# Patient Record
Sex: FEMALE | Race: WHITE | NOT HISPANIC OR LATINO | Employment: OTHER | URBAN - METROPOLITAN AREA
[De-identification: names, ages, dates, MRNs, and addresses within clinical notes are randomized per-mention and may not be internally consistent; named-entity substitution may affect disease eponyms.]

---

## 2024-04-12 RX ORDER — POLYETHYLENE GLYCOL 3350 17 G/17G
1 POWDER, FOR SOLUTION ORAL DAILY
COMMUNITY

## 2024-04-12 RX ORDER — MULTIVITAMIN,THERAPEUTIC
1 TABLET ORAL DAILY
COMMUNITY

## 2024-04-12 RX ORDER — DOXEPIN 3 MG/1
TABLET, FILM COATED ORAL
COMMUNITY
Start: 2023-05-04

## 2024-04-12 RX ORDER — ACETAMINOPHEN 500 MG
500-1000 TABLET ORAL EVERY 8 HOURS PRN
COMMUNITY
Start: 2023-09-27

## 2024-04-12 RX ORDER — IPRATROPIUM BROMIDE 21 UG/1
1 SPRAY, METERED NASAL
COMMUNITY
Start: 2023-01-05 | End: 2024-04-12

## 2024-04-12 RX ORDER — FLUTICASONE PROPIONATE 50 MCG
SPRAY, SUSPENSION (ML) NASAL
COMMUNITY
Start: 2024-02-26 | End: 2024-04-12

## 2024-04-12 RX ORDER — ATORVASTATIN CALCIUM 20 MG/1
20 TABLET, FILM COATED ORAL AT BEDTIME
COMMUNITY
Start: 2022-07-13

## 2024-04-12 RX ORDER — DONEPEZIL HYDROCHLORIDE 5 MG/1
5 TABLET, FILM COATED ORAL AT BEDTIME
COMMUNITY
Start: 2022-07-13

## 2024-04-12 RX ORDER — RALOXIFENE HYDROCHLORIDE 60 MG/1
60 TABLET, FILM COATED ORAL DAILY
COMMUNITY

## 2024-04-12 RX ORDER — CETIRIZINE HYDROCHLORIDE 10 MG/1
10 TABLET ORAL DAILY
COMMUNITY

## 2024-04-12 RX ORDER — PANTOPRAZOLE SODIUM 40 MG/1
TABLET, DELAYED RELEASE ORAL
COMMUNITY
Start: 2023-04-21

## 2024-04-12 RX ORDER — CALCIUM CARBONATE/VITAMIN D3 600 MG-10
1 TABLET ORAL 2 TIMES DAILY
COMMUNITY

## 2024-04-12 RX ORDER — NADOLOL 40 MG/1
40 TABLET ORAL DAILY
COMMUNITY

## 2024-04-12 RX ORDER — SERTRALINE HYDROCHLORIDE 100 MG/1
100 TABLET, FILM COATED ORAL
COMMUNITY
Start: 2022-07-13

## 2024-04-12 RX ORDER — LANOLIN ALCOHOL/MO/W.PET/CERES
3 CREAM (GRAM) TOPICAL
COMMUNITY

## 2024-04-12 RX ORDER — AMLODIPINE BESYLATE 5 MG/1
5 TABLET ORAL DAILY
COMMUNITY

## 2024-04-30 RX ORDER — AMOXICILLIN 250 MG
1 CAPSULE ORAL 2 TIMES DAILY
COMMUNITY

## 2024-04-30 NOTE — PHARMACY-ADMISSION MEDICATION HISTORY
Pharmacist Admission Medication History    Admission medication history is complete. The information provided in this note is only as accurate as the sources available at the time of the update.    Med rec completed by preadmitting RN   Reviewed by Patito Tracey RN (Registered Nurse) on 04/30/24 at 1338     Added frequencies to multiple entries for completeness       Medication History Completed By: Soumya Sheets [facility] 4/30/2024 2:20 PM    PTA Med List   Medication Sig Last Dose    acetaminophen (TYLENOL) 500 MG tablet Take 500-1,000 mg by mouth every 8 hours as needed     amLODIPine (NORVASC) 5 MG tablet Take 5 mg by mouth daily     atorvastatin (LIPITOR) 20 MG tablet Take 20 mg by mouth at bedtime     calcium carbonate-vitamin D (CALTRATE) 600-10 MG-MCG per tablet Take 1 tablet by mouth 2 times daily     cetirizine (ZYRTEC) 10 MG tablet Take 10 mg by mouth daily     donepezil (ARICEPT) 5 MG tablet Take 5 mg by mouth at bedtime     doxepin (SILENOR) 3 MG tablet TAKE 1 TABLET BY MOUTH EVERY DAY AT NIGHT 30 MINUTES BEFORE BEDTIME ON AN EMPTY STOMACH     melatonin 3 MG tablet Take 3 mg by mouth nightly as needed     multivitamin, therapeutic (THERA-VIT) TABS tablet Take 1 tablet by mouth daily     nadolol (CORGARD) 40 MG tablet Take 40 mg by mouth daily     pantoprazole (PROTONIX) 40 MG EC tablet 1 tablet Orally Once a day for 90 days     polyethylene glycol (MIRALAX) 17 g packet Take 1 packet by mouth daily     raloxifene (EVISTA) 60 MG tablet Take 60 mg by mouth daily     senna-docusate (SENOKOT-S/PERICOLACE) 8.6-50 MG tablet Take 1 tablet by mouth 2 times daily     sertraline (ZOLOFT) 100 MG tablet Take 100 mg by mouth

## 2024-05-01 ENCOUNTER — APPOINTMENT (OUTPATIENT)
Dept: GENERAL RADIOLOGY | Facility: CLINIC | Age: 69
DRG: 454 | End: 2024-05-01
Attending: ORTHOPAEDIC SURGERY
Payer: MEDICARE

## 2024-05-01 ENCOUNTER — ANESTHESIA (OUTPATIENT)
Dept: SURGERY | Facility: CLINIC | Age: 69
DRG: 454 | End: 2024-05-01
Payer: MEDICARE

## 2024-05-01 ENCOUNTER — ANESTHESIA EVENT (OUTPATIENT)
Dept: SURGERY | Facility: CLINIC | Age: 69
DRG: 454 | End: 2024-05-01
Payer: MEDICARE

## 2024-05-01 ENCOUNTER — HOSPITAL ENCOUNTER (INPATIENT)
Facility: CLINIC | Age: 69
LOS: 5 days | Discharge: NURSING, REHAB OR LONG-TERM CARE | DRG: 454 | End: 2024-05-06
Attending: ORTHOPAEDIC SURGERY | Admitting: ORTHOPAEDIC SURGERY
Payer: MEDICARE

## 2024-05-01 DIAGNOSIS — Z98.890 H/O SPINAL SURGERY: Primary | ICD-10-CM

## 2024-05-01 LAB
ABO/RH(D): NORMAL
ANTIBODY SCREEN: NEGATIVE
GLUCOSE BLDC GLUCOMTR-MCNC: 88 MG/DL (ref 70–99)
HGB BLD-MCNC: 13.4 G/DL (ref 11.7–15.7)
SPECIMEN EXPIRATION DATE: NORMAL

## 2024-05-01 PROCEDURE — 250N000011 HC RX IP 250 OP 636

## 2024-05-01 PROCEDURE — 85018 HEMOGLOBIN: CPT | Performed by: ORTHOPAEDIC SURGERY

## 2024-05-01 PROCEDURE — 710N000009 HC RECOVERY PHASE 1, LEVEL 1, PER MIN: Performed by: ORTHOPAEDIC SURGERY

## 2024-05-01 PROCEDURE — 258N000003 HC RX IP 258 OP 636

## 2024-05-01 PROCEDURE — 250N000011 HC RX IP 250 OP 636: Performed by: ORTHOPAEDIC SURGERY

## 2024-05-01 PROCEDURE — 250N000013 HC RX MED GY IP 250 OP 250 PS 637: Performed by: ANESTHESIOLOGY

## 2024-05-01 PROCEDURE — 86900 BLOOD TYPING SEROLOGIC ABO: CPT | Performed by: ORTHOPAEDIC SURGERY

## 2024-05-01 PROCEDURE — 370N000017 HC ANESTHESIA TECHNICAL FEE, PER MIN: Performed by: ORTHOPAEDIC SURGERY

## 2024-05-01 PROCEDURE — 120N000001 HC R&B MED SURG/OB

## 2024-05-01 PROCEDURE — 4A11X4G MONITORING OF PERIPHERAL NERVOUS ELECTRICAL ACTIVITY, INTRAOPERATIVE, EXTERNAL APPROACH: ICD-10-PCS | Performed by: ORTHOPAEDIC SURGERY

## 2024-05-01 PROCEDURE — 99222 1ST HOSP IP/OBS MODERATE 55: CPT | Mod: AI | Performed by: INTERNAL MEDICINE

## 2024-05-01 PROCEDURE — 258N000003 HC RX IP 258 OP 636: Performed by: ANESTHESIOLOGY

## 2024-05-01 PROCEDURE — 250N000013 HC RX MED GY IP 250 OP 250 PS 637: Performed by: INTERNAL MEDICINE

## 2024-05-01 PROCEDURE — 250N000025 HC SEVOFLURANE, PER MIN: Performed by: ORTHOPAEDIC SURGERY

## 2024-05-01 PROCEDURE — 360N000086 HC SURGERY LEVEL 6 W/ FLUORO, PER MIN: Performed by: ORTHOPAEDIC SURGERY

## 2024-05-01 PROCEDURE — 250N000013 HC RX MED GY IP 250 OP 250 PS 637

## 2024-05-01 PROCEDURE — C1713 ANCHOR/SCREW BN/BN,TIS/BN: HCPCS | Performed by: ORTHOPAEDIC SURGERY

## 2024-05-01 PROCEDURE — 250N000011 HC RX IP 250 OP 636: Performed by: ANESTHESIOLOGY

## 2024-05-01 PROCEDURE — 0SB20ZZ EXCISION OF LUMBAR VERTEBRAL DISC, OPEN APPROACH: ICD-10-PCS | Performed by: ORTHOPAEDIC SURGERY

## 2024-05-01 PROCEDURE — 8E0WXBF COMPUTER ASSISTED PROCEDURE OF TRUNK REGION, WITH FLUOROSCOPY: ICD-10-PCS | Performed by: ORTHOPAEDIC SURGERY

## 2024-05-01 PROCEDURE — 999N000141 HC STATISTIC PRE-PROCEDURE NURSING ASSESSMENT: Performed by: ORTHOPAEDIC SURGERY

## 2024-05-01 PROCEDURE — 36415 COLL VENOUS BLD VENIPUNCTURE: CPT | Performed by: ORTHOPAEDIC SURGERY

## 2024-05-01 PROCEDURE — 250N000009 HC RX 250: Performed by: ORTHOPAEDIC SURGERY

## 2024-05-01 PROCEDURE — 0SG1071 FUSION OF 2 OR MORE LUMBAR VERTEBRAL JOINTS WITH AUTOLOGOUS TISSUE SUBSTITUTE, POSTERIOR APPROACH, POSTERIOR COLUMN, OPEN APPROACH: ICD-10-PCS | Performed by: ORTHOPAEDIC SURGERY

## 2024-05-01 PROCEDURE — 999N000179 XR SURGERY CARM FLUORO LESS THAN 5 MIN W STILLS: Mod: TC

## 2024-05-01 PROCEDURE — 0SG10A0 FUSION OF 2 OR MORE LUMBAR VERTEBRAL JOINTS WITH INTERBODY FUSION DEVICE, ANTERIOR APPROACH, ANTERIOR COLUMN, OPEN APPROACH: ICD-10-PCS | Performed by: ORTHOPAEDIC SURGERY

## 2024-05-01 PROCEDURE — 922N000001 HC NEURO MONITORING SERVICE, UP TO 7 HOURS (T1FEE): Performed by: ORTHOPAEDIC SURGERY

## 2024-05-01 PROCEDURE — 250N000009 HC RX 250: Performed by: NURSE ANESTHETIST, CERTIFIED REGISTERED

## 2024-05-01 PROCEDURE — 258N000003 HC RX IP 258 OP 636: Performed by: NURSE ANESTHETIST, CERTIFIED REGISTERED

## 2024-05-01 PROCEDURE — 250N000011 HC RX IP 250 OP 636: Performed by: NURSE ANESTHETIST, CERTIFIED REGISTERED

## 2024-05-01 PROCEDURE — 272N000001 HC OR GENERAL SUPPLY STERILE: Performed by: ORTHOPAEDIC SURGERY

## 2024-05-01 DEVICE — IMPLANTABLE DEVICE: Type: IMPLANTABLE DEVICE | Site: SPINE LUMBAR | Status: FUNCTIONAL

## 2024-05-01 DEVICE — IMP WIRE KIRSCHNER AMENDIA 1.4MMX18" THRD 9080-18T: Type: IMPLANTABLE DEVICE | Site: SPINE LUMBAR | Status: FUNCTIONAL

## 2024-05-01 DEVICE — SCREW SET SPNE STAR OPN LNK PATHLOC-L STRL LF: Type: IMPLANTABLE DEVICE | Site: SPINE LUMBAR | Status: FUNCTIONAL

## 2024-05-01 RX ORDER — BISACODYL 10 MG
10 SUPPOSITORY, RECTAL RECTAL DAILY PRN
Status: DISCONTINUED | OUTPATIENT
Start: 2024-05-04 | End: 2024-05-06 | Stop reason: HOSPADM

## 2024-05-01 RX ORDER — OXYCODONE HYDROCHLORIDE 5 MG/1
10 TABLET ORAL EVERY 4 HOURS PRN
Status: DISCONTINUED | OUTPATIENT
Start: 2024-05-01 | End: 2024-05-01 | Stop reason: HOSPADM

## 2024-05-01 RX ORDER — DEXAMETHASONE SODIUM PHOSPHATE 4 MG/ML
4 INJECTION, SOLUTION INTRA-ARTICULAR; INTRALESIONAL; INTRAMUSCULAR; INTRAVENOUS; SOFT TISSUE
Status: DISCONTINUED | OUTPATIENT
Start: 2024-05-01 | End: 2024-05-01 | Stop reason: HOSPADM

## 2024-05-01 RX ORDER — NADOLOL 20 MG/1
40 TABLET ORAL DAILY
Status: DISCONTINUED | OUTPATIENT
Start: 2024-05-01 | End: 2024-05-06 | Stop reason: HOSPADM

## 2024-05-01 RX ORDER — TRANEXAMIC ACID 10 MG/ML
10 INJECTION, SOLUTION INTRAVENOUS ONCE
Status: COMPLETED | OUTPATIENT
Start: 2024-05-01 | End: 2024-05-01

## 2024-05-01 RX ORDER — NALOXONE HYDROCHLORIDE 0.4 MG/ML
0.2 INJECTION, SOLUTION INTRAMUSCULAR; INTRAVENOUS; SUBCUTANEOUS
Status: DISCONTINUED | OUTPATIENT
Start: 2024-05-01 | End: 2024-05-06 | Stop reason: HOSPADM

## 2024-05-01 RX ORDER — NALOXONE HYDROCHLORIDE 0.4 MG/ML
0.1 INJECTION, SOLUTION INTRAMUSCULAR; INTRAVENOUS; SUBCUTANEOUS
Status: DISCONTINUED | OUTPATIENT
Start: 2024-05-01 | End: 2024-05-01 | Stop reason: HOSPADM

## 2024-05-01 RX ORDER — NALOXONE HYDROCHLORIDE 0.4 MG/ML
0.4 INJECTION, SOLUTION INTRAMUSCULAR; INTRAVENOUS; SUBCUTANEOUS
Status: DISCONTINUED | OUTPATIENT
Start: 2024-05-01 | End: 2024-05-06 | Stop reason: HOSPADM

## 2024-05-01 RX ORDER — ACETAMINOPHEN 325 MG/1
650 TABLET ORAL EVERY 4 HOURS PRN
Status: DISCONTINUED | OUTPATIENT
Start: 2024-05-04 | End: 2024-05-01

## 2024-05-01 RX ORDER — HYDROMORPHONE HCL IN WATER/PF 6 MG/30 ML
0.2 PATIENT CONTROLLED ANALGESIA SYRINGE INTRAVENOUS EVERY 5 MIN PRN
Status: DISCONTINUED | OUTPATIENT
Start: 2024-05-01 | End: 2024-05-01 | Stop reason: HOSPADM

## 2024-05-01 RX ORDER — CITRIC ACID/SODIUM CITRATE 334-500MG
30 SOLUTION, ORAL ORAL ONCE
Status: COMPLETED | OUTPATIENT
Start: 2024-05-01 | End: 2024-05-01

## 2024-05-01 RX ORDER — HYDROMORPHONE HCL IN WATER/PF 6 MG/30 ML
0.2 PATIENT CONTROLLED ANALGESIA SYRINGE INTRAVENOUS
Status: DISCONTINUED | OUTPATIENT
Start: 2024-05-01 | End: 2024-05-06 | Stop reason: HOSPADM

## 2024-05-01 RX ORDER — FENTANYL CITRATE 50 UG/ML
INJECTION, SOLUTION INTRAMUSCULAR; INTRAVENOUS PRN
Status: DISCONTINUED | OUTPATIENT
Start: 2024-05-01 | End: 2024-05-01

## 2024-05-01 RX ORDER — BUPIVACAINE HYDROCHLORIDE 2.5 MG/ML
INJECTION, SOLUTION EPIDURAL; INFILTRATION; INTRACAUDAL PRN
Status: DISCONTINUED | OUTPATIENT
Start: 2024-05-01 | End: 2024-05-01 | Stop reason: HOSPADM

## 2024-05-01 RX ORDER — CEFAZOLIN SODIUM/WATER 2 G/20 ML
2 SYRINGE (ML) INTRAVENOUS SEE ADMIN INSTRUCTIONS
Status: DISCONTINUED | OUTPATIENT
Start: 2024-05-01 | End: 2024-05-01 | Stop reason: HOSPADM

## 2024-05-01 RX ORDER — ATORVASTATIN CALCIUM 20 MG/1
20 TABLET, FILM COATED ORAL AT BEDTIME
Status: DISCONTINUED | OUTPATIENT
Start: 2024-05-01 | End: 2024-05-06 | Stop reason: HOSPADM

## 2024-05-01 RX ORDER — METHOCARBAMOL 750 MG/1
750 TABLET, FILM COATED ORAL EVERY 6 HOURS PRN
Status: DISCONTINUED | OUTPATIENT
Start: 2024-05-01 | End: 2024-05-06 | Stop reason: HOSPADM

## 2024-05-01 RX ORDER — DOXEPIN 3 MG/1
3 TABLET, FILM COATED ORAL
Status: DISCONTINUED | OUTPATIENT
Start: 2024-05-01 | End: 2024-05-01

## 2024-05-01 RX ORDER — OXYCODONE HYDROCHLORIDE 5 MG/1
5 TABLET ORAL EVERY 4 HOURS PRN
Status: DISCONTINUED | OUTPATIENT
Start: 2024-05-01 | End: 2024-05-03

## 2024-05-01 RX ORDER — POLYETHYLENE GLYCOL 3350 17 G/17G
17 POWDER, FOR SOLUTION ORAL DAILY
Status: DISCONTINUED | OUTPATIENT
Start: 2024-05-02 | End: 2024-05-06 | Stop reason: HOSPADM

## 2024-05-01 RX ORDER — HYDROMORPHONE HCL IN WATER/PF 6 MG/30 ML
0.4 PATIENT CONTROLLED ANALGESIA SYRINGE INTRAVENOUS EVERY 5 MIN PRN
Status: DISCONTINUED | OUTPATIENT
Start: 2024-05-01 | End: 2024-05-01 | Stop reason: HOSPADM

## 2024-05-01 RX ORDER — KETOROLAC TROMETHAMINE 15 MG/ML
15 INJECTION, SOLUTION INTRAMUSCULAR; INTRAVENOUS EVERY 6 HOURS PRN
Status: COMPLETED | OUTPATIENT
Start: 2024-05-01 | End: 2024-05-03

## 2024-05-01 RX ORDER — ONDANSETRON 4 MG/1
4 TABLET, ORALLY DISINTEGRATING ORAL EVERY 30 MIN PRN
Status: DISCONTINUED | OUTPATIENT
Start: 2024-05-01 | End: 2024-05-01 | Stop reason: HOSPADM

## 2024-05-01 RX ORDER — DEXAMETHASONE SODIUM PHOSPHATE 4 MG/ML
INJECTION, SOLUTION INTRA-ARTICULAR; INTRALESIONAL; INTRAMUSCULAR; INTRAVENOUS; SOFT TISSUE PRN
Status: DISCONTINUED | OUTPATIENT
Start: 2024-05-01 | End: 2024-05-01

## 2024-05-01 RX ORDER — CETIRIZINE HYDROCHLORIDE 10 MG/1
10 TABLET ORAL DAILY
Status: DISCONTINUED | OUTPATIENT
Start: 2024-05-01 | End: 2024-05-06 | Stop reason: HOSPADM

## 2024-05-01 RX ORDER — MEPERIDINE HYDROCHLORIDE 25 MG/ML
12.5 INJECTION INTRAMUSCULAR; INTRAVENOUS; SUBCUTANEOUS EVERY 5 MIN PRN
Status: DISCONTINUED | OUTPATIENT
Start: 2024-05-01 | End: 2024-05-01 | Stop reason: HOSPADM

## 2024-05-01 RX ORDER — OXYCODONE HYDROCHLORIDE 5 MG/1
5 TABLET ORAL EVERY 4 HOURS PRN
Status: DISCONTINUED | OUTPATIENT
Start: 2024-05-01 | End: 2024-05-01 | Stop reason: HOSPADM

## 2024-05-01 RX ORDER — ACETAMINOPHEN 325 MG/1
975 TABLET ORAL ONCE
Status: COMPLETED | OUTPATIENT
Start: 2024-05-01 | End: 2024-05-01

## 2024-05-01 RX ORDER — AMLODIPINE BESYLATE 5 MG/1
5 TABLET ORAL DAILY
Status: DISCONTINUED | OUTPATIENT
Start: 2024-05-01 | End: 2024-05-06 | Stop reason: HOSPADM

## 2024-05-01 RX ORDER — DIAZEPAM 10 MG/2ML
2.5 INJECTION, SOLUTION INTRAMUSCULAR; INTRAVENOUS
Status: DISCONTINUED | OUTPATIENT
Start: 2024-05-01 | End: 2024-05-01 | Stop reason: HOSPADM

## 2024-05-01 RX ORDER — ONDANSETRON 2 MG/ML
INJECTION INTRAMUSCULAR; INTRAVENOUS PRN
Status: DISCONTINUED | OUTPATIENT
Start: 2024-05-01 | End: 2024-05-01

## 2024-05-01 RX ORDER — SODIUM CHLORIDE, SODIUM LACTATE, POTASSIUM CHLORIDE, CALCIUM CHLORIDE 600; 310; 30; 20 MG/100ML; MG/100ML; MG/100ML; MG/100ML
INJECTION, SOLUTION INTRAVENOUS CONTINUOUS
Status: DISCONTINUED | OUTPATIENT
Start: 2024-05-01 | End: 2024-05-01 | Stop reason: HOSPADM

## 2024-05-01 RX ORDER — CEFAZOLIN SODIUM/WATER 2 G/20 ML
2 SYRINGE (ML) INTRAVENOUS
Status: COMPLETED | OUTPATIENT
Start: 2024-05-01 | End: 2024-05-01

## 2024-05-01 RX ORDER — SODIUM CHLORIDE 9 MG/ML
INJECTION, SOLUTION INTRAVENOUS CONTINUOUS
Status: DISCONTINUED | OUTPATIENT
Start: 2024-05-01 | End: 2024-05-03

## 2024-05-01 RX ORDER — DOXEPIN HYDROCHLORIDE 10 MG/ML
3 SOLUTION ORAL
Status: DISCONTINUED | OUTPATIENT
Start: 2024-05-01 | End: 2024-05-06 | Stop reason: HOSPADM

## 2024-05-01 RX ORDER — ALBUTEROL SULFATE 0.83 MG/ML
2.5 SOLUTION RESPIRATORY (INHALATION) EVERY 4 HOURS PRN
Status: DISCONTINUED | OUTPATIENT
Start: 2024-05-01 | End: 2024-05-01 | Stop reason: HOSPADM

## 2024-05-01 RX ORDER — ACETAMINOPHEN 325 MG/1
975 TABLET ORAL EVERY 8 HOURS
Qty: 27 TABLET | Refills: 0 | Status: DISCONTINUED | OUTPATIENT
Start: 2024-05-01 | End: 2024-05-01

## 2024-05-01 RX ORDER — RALOXIFENE HYDROCHLORIDE 60 MG/1
60 TABLET, FILM COATED ORAL DAILY
Status: DISCONTINUED | OUTPATIENT
Start: 2024-05-01 | End: 2024-05-06 | Stop reason: HOSPADM

## 2024-05-01 RX ORDER — PROPOFOL 10 MG/ML
INJECTION, EMULSION INTRAVENOUS PRN
Status: DISCONTINUED | OUTPATIENT
Start: 2024-05-01 | End: 2024-05-01

## 2024-05-01 RX ORDER — AMOXICILLIN 250 MG
1 CAPSULE ORAL 2 TIMES DAILY
Status: DISCONTINUED | OUTPATIENT
Start: 2024-05-01 | End: 2024-05-06 | Stop reason: HOSPADM

## 2024-05-01 RX ORDER — ACETAMINOPHEN 500 MG
500-1000 TABLET ORAL EVERY 8 HOURS PRN
Status: DISCONTINUED | OUTPATIENT
Start: 2024-05-01 | End: 2024-05-06 | Stop reason: HOSPADM

## 2024-05-01 RX ORDER — ONDANSETRON 2 MG/ML
4 INJECTION INTRAMUSCULAR; INTRAVENOUS EVERY 30 MIN PRN
Status: DISCONTINUED | OUTPATIENT
Start: 2024-05-01 | End: 2024-05-01 | Stop reason: HOSPADM

## 2024-05-01 RX ORDER — GLYCOPYRROLATE 0.2 MG/ML
INJECTION, SOLUTION INTRAMUSCULAR; INTRAVENOUS PRN
Status: DISCONTINUED | OUTPATIENT
Start: 2024-05-01 | End: 2024-05-01

## 2024-05-01 RX ORDER — PROPOFOL 10 MG/ML
INJECTION, EMULSION INTRAVENOUS CONTINUOUS PRN
Status: DISCONTINUED | OUTPATIENT
Start: 2024-05-01 | End: 2024-05-01

## 2024-05-01 RX ORDER — DONEPEZIL HYDROCHLORIDE 5 MG/1
5 TABLET, FILM COATED ORAL AT BEDTIME
Status: DISCONTINUED | OUTPATIENT
Start: 2024-05-01 | End: 2024-05-06 | Stop reason: HOSPADM

## 2024-05-01 RX ORDER — ONDANSETRON 2 MG/ML
4 INJECTION INTRAMUSCULAR; INTRAVENOUS EVERY 6 HOURS PRN
Status: DISCONTINUED | OUTPATIENT
Start: 2024-05-01 | End: 2024-05-06 | Stop reason: HOSPADM

## 2024-05-01 RX ORDER — LIDOCAINE 40 MG/G
CREAM TOPICAL
Status: DISCONTINUED | OUTPATIENT
Start: 2024-05-01 | End: 2024-05-01 | Stop reason: HOSPADM

## 2024-05-01 RX ORDER — LIDOCAINE 40 MG/G
CREAM TOPICAL
Status: DISCONTINUED | OUTPATIENT
Start: 2024-05-01 | End: 2024-05-06 | Stop reason: HOSPADM

## 2024-05-01 RX ORDER — FENTANYL CITRATE 50 UG/ML
50 INJECTION, SOLUTION INTRAMUSCULAR; INTRAVENOUS EVERY 5 MIN PRN
Status: DISCONTINUED | OUTPATIENT
Start: 2024-05-01 | End: 2024-05-01 | Stop reason: HOSPADM

## 2024-05-01 RX ORDER — LABETALOL HYDROCHLORIDE 5 MG/ML
10 INJECTION, SOLUTION INTRAVENOUS EVERY 10 MIN PRN
Status: DISCONTINUED | OUTPATIENT
Start: 2024-05-01 | End: 2024-05-01 | Stop reason: HOSPADM

## 2024-05-01 RX ORDER — AMOXICILLIN 250 MG
1 CAPSULE ORAL 2 TIMES DAILY
Status: DISCONTINUED | OUTPATIENT
Start: 2024-05-01 | End: 2024-05-01

## 2024-05-01 RX ORDER — PROCHLORPERAZINE MALEATE 5 MG
5 TABLET ORAL EVERY 6 HOURS PRN
Status: DISCONTINUED | OUTPATIENT
Start: 2024-05-01 | End: 2024-05-06 | Stop reason: HOSPADM

## 2024-05-01 RX ORDER — HYDROMORPHONE HCL IN WATER/PF 6 MG/30 ML
0.4 PATIENT CONTROLLED ANALGESIA SYRINGE INTRAVENOUS
Status: DISCONTINUED | OUTPATIENT
Start: 2024-05-01 | End: 2024-05-06 | Stop reason: HOSPADM

## 2024-05-01 RX ORDER — OXYCODONE HYDROCHLORIDE 5 MG/1
10 TABLET ORAL EVERY 4 HOURS PRN
Status: DISCONTINUED | OUTPATIENT
Start: 2024-05-01 | End: 2024-05-03

## 2024-05-01 RX ORDER — PANTOPRAZOLE SODIUM 40 MG/1
40 TABLET, DELAYED RELEASE ORAL
Status: DISCONTINUED | OUTPATIENT
Start: 2024-05-02 | End: 2024-05-06 | Stop reason: HOSPADM

## 2024-05-01 RX ORDER — ONDANSETRON 4 MG/1
4 TABLET, ORALLY DISINTEGRATING ORAL EVERY 6 HOURS PRN
Status: DISCONTINUED | OUTPATIENT
Start: 2024-05-01 | End: 2024-05-06 | Stop reason: HOSPADM

## 2024-05-01 RX ORDER — HYDROXYZINE HYDROCHLORIDE 10 MG/1
10 TABLET, FILM COATED ORAL EVERY 6 HOURS PRN
Status: DISCONTINUED | OUTPATIENT
Start: 2024-05-01 | End: 2024-05-06 | Stop reason: HOSPADM

## 2024-05-01 RX ORDER — SERTRALINE HYDROCHLORIDE 100 MG/1
100 TABLET, FILM COATED ORAL DAILY
Status: DISCONTINUED | OUTPATIENT
Start: 2024-05-01 | End: 2024-05-06 | Stop reason: HOSPADM

## 2024-05-01 RX ORDER — FENTANYL CITRATE 50 UG/ML
25 INJECTION, SOLUTION INTRAMUSCULAR; INTRAVENOUS
Status: DISCONTINUED | OUTPATIENT
Start: 2024-05-01 | End: 2024-05-01 | Stop reason: HOSPADM

## 2024-05-01 RX ORDER — CEFAZOLIN SODIUM 1 G/3ML
1 INJECTION, POWDER, FOR SOLUTION INTRAMUSCULAR; INTRAVENOUS EVERY 8 HOURS
Qty: 15 ML | Refills: 0 | Status: COMPLETED | OUTPATIENT
Start: 2024-05-01 | End: 2024-05-02

## 2024-05-01 RX ORDER — LIDOCAINE HYDROCHLORIDE 20 MG/ML
INJECTION, SOLUTION INFILTRATION; PERINEURAL PRN
Status: DISCONTINUED | OUTPATIENT
Start: 2024-05-01 | End: 2024-05-01

## 2024-05-01 RX ADMIN — SODIUM CHLORIDE, POTASSIUM CHLORIDE, SODIUM LACTATE AND CALCIUM CHLORIDE: 600; 310; 30; 20 INJECTION, SOLUTION INTRAVENOUS at 06:56

## 2024-05-01 RX ADMIN — GLYCOPYRROLATE 0.2 MG: 0.2 INJECTION, SOLUTION INTRAMUSCULAR; INTRAVENOUS at 07:40

## 2024-05-01 RX ADMIN — ATORVASTATIN CALCIUM 20 MG: 20 TABLET, FILM COATED ORAL at 21:12

## 2024-05-01 RX ADMIN — HYDROMORPHONE HYDROCHLORIDE 0.2 MG: 0.2 INJECTION, SOLUTION INTRAMUSCULAR; INTRAVENOUS; SUBCUTANEOUS at 18:06

## 2024-05-01 RX ADMIN — PHENYLEPHRINE HYDROCHLORIDE 0.3 MCG/KG/MIN: 10 INJECTION INTRAVENOUS at 08:33

## 2024-05-01 RX ADMIN — ACETAMINOPHEN 975 MG: 325 TABLET, FILM COATED ORAL at 07:11

## 2024-05-01 RX ADMIN — SODIUM CITRATE AND CITRIC ACID MONOHYDRATE 30 ML: 500; 334 SOLUTION ORAL at 07:11

## 2024-05-01 RX ADMIN — SENNOSIDES AND DOCUSATE SODIUM 1 TABLET: 8.6; 5 TABLET ORAL at 19:30

## 2024-05-01 RX ADMIN — LIDOCAINE HYDROCHLORIDE 50 MG: 20 INJECTION, SOLUTION INFILTRATION; PERINEURAL at 07:40

## 2024-05-01 RX ADMIN — TRANEXAMIC ACID 658 MG: 10 INJECTION, SOLUTION INTRAVENOUS at 07:54

## 2024-05-01 RX ADMIN — Medication 60 MG: at 07:41

## 2024-05-01 RX ADMIN — CEFAZOLIN 1 G: 1 INJECTION, POWDER, FOR SOLUTION INTRAMUSCULAR; INTRAVENOUS at 16:00

## 2024-05-01 RX ADMIN — DONEPEZIL HYDROCHLORIDE 5 MG: 5 TABLET ORAL at 21:12

## 2024-05-01 RX ADMIN — HYDROMORPHONE HYDROCHLORIDE 0.2 MG: 0.2 INJECTION, SOLUTION INTRAMUSCULAR; INTRAVENOUS; SUBCUTANEOUS at 15:52

## 2024-05-01 RX ADMIN — FENTANYL CITRATE 25 MCG: 50 INJECTION, SOLUTION INTRAMUSCULAR; INTRAVENOUS at 11:55

## 2024-05-01 RX ADMIN — PHENYLEPHRINE HYDROCHLORIDE 100 MCG: 10 INJECTION INTRAVENOUS at 08:17

## 2024-05-01 RX ADMIN — PHENYLEPHRINE HYDROCHLORIDE 100 MCG: 10 INJECTION INTRAVENOUS at 08:32

## 2024-05-01 RX ADMIN — ACETAMINOPHEN 975 MG: 325 TABLET, FILM COATED ORAL at 14:10

## 2024-05-01 RX ADMIN — HYDROMORPHONE HYDROCHLORIDE 0.5 MG: 1 INJECTION, SOLUTION INTRAMUSCULAR; INTRAVENOUS; SUBCUTANEOUS at 09:47

## 2024-05-01 RX ADMIN — CEFAZOLIN 1 G: 1 INJECTION, POWDER, FOR SOLUTION INTRAMUSCULAR; INTRAVENOUS at 23:04

## 2024-05-01 RX ADMIN — SODIUM CHLORIDE: 9 INJECTION, SOLUTION INTRAVENOUS at 23:29

## 2024-05-01 RX ADMIN — FENTANYL CITRATE 100 MCG: 50 INJECTION INTRAMUSCULAR; INTRAVENOUS at 07:40

## 2024-05-01 RX ADMIN — RALOXIFENE HYDROCHLORIDE 60 MG: 60 TABLET, FILM COATED ORAL at 19:30

## 2024-05-01 RX ADMIN — ONDANSETRON 4 MG: 2 INJECTION INTRAMUSCULAR; INTRAVENOUS at 10:10

## 2024-05-01 RX ADMIN — METHOCARBAMOL 750 MG: 750 TABLET ORAL at 14:10

## 2024-05-01 RX ADMIN — SODIUM CHLORIDE: 9 INJECTION, SOLUTION INTRAVENOUS at 13:13

## 2024-05-01 RX ADMIN — SODIUM CHLORIDE, POTASSIUM CHLORIDE, SODIUM LACTATE AND CALCIUM CHLORIDE: 600; 310; 30; 20 INJECTION, SOLUTION INTRAVENOUS at 10:01

## 2024-05-01 RX ADMIN — PROPOFOL 100 MCG/KG/MIN: 10 INJECTION, EMULSION INTRAVENOUS at 07:45

## 2024-05-01 RX ADMIN — PHENYLEPHRINE HYDROCHLORIDE 100 MCG: 10 INJECTION INTRAVENOUS at 07:50

## 2024-05-01 RX ADMIN — OXYCODONE HYDROCHLORIDE 5 MG: 5 TABLET ORAL at 19:22

## 2024-05-01 RX ADMIN — FENTANYL CITRATE 25 MCG: 50 INJECTION, SOLUTION INTRAMUSCULAR; INTRAVENOUS at 11:19

## 2024-05-01 RX ADMIN — PROPOFOL 150 MG: 10 INJECTION, EMULSION INTRAVENOUS at 07:40

## 2024-05-01 RX ADMIN — MIDAZOLAM 2 MG: 1 INJECTION INTRAMUSCULAR; INTRAVENOUS at 07:35

## 2024-05-01 RX ADMIN — Medication 2 G: at 07:32

## 2024-05-01 RX ADMIN — KETOROLAC TROMETHAMINE 15 MG: 15 INJECTION, SOLUTION INTRAMUSCULAR; INTRAVENOUS at 21:13

## 2024-05-01 RX ADMIN — SERTRALINE HYDROCHLORIDE 100 MG: 100 TABLET ORAL at 19:30

## 2024-05-01 RX ADMIN — DEXAMETHASONE SODIUM PHOSPHATE 8 MG: 4 INJECTION, SOLUTION INTRA-ARTICULAR; INTRALESIONAL; INTRAMUSCULAR; INTRAVENOUS; SOFT TISSUE at 07:41

## 2024-05-01 RX ADMIN — AMLODIPINE BESYLATE 5 MG: 5 TABLET ORAL at 19:22

## 2024-05-01 RX ADMIN — PHENYLEPHRINE HYDROCHLORIDE 100 MCG: 10 INJECTION INTRAVENOUS at 08:02

## 2024-05-01 RX ADMIN — KETOROLAC TROMETHAMINE 15 MG: 15 INJECTION, SOLUTION INTRAMUSCULAR; INTRAVENOUS at 13:23

## 2024-05-01 RX ADMIN — HYDROMORPHONE HYDROCHLORIDE 0.5 MG: 1 INJECTION, SOLUTION INTRAMUSCULAR; INTRAVENOUS; SUBCUTANEOUS at 08:15

## 2024-05-01 RX ADMIN — HYDROMORPHONE HYDROCHLORIDE 0.5 MG: 1 INJECTION, SOLUTION INTRAMUSCULAR; INTRAVENOUS; SUBCUTANEOUS at 08:45

## 2024-05-01 ASSESSMENT — ACTIVITIES OF DAILY LIVING (ADL)
ADLS_ACUITY_SCORE: 20
ADLS_ACUITY_SCORE: 22
ADLS_ACUITY_SCORE: 20
ADLS_ACUITY_SCORE: 22
FALL_HISTORY_WITHIN_LAST_SIX_MONTHS: NO
ADLS_ACUITY_SCORE: 22
ADLS_ACUITY_SCORE: 20
ADLS_ACUITY_SCORE: 22
TOILETING_ISSUES: NO
ADLS_ACUITY_SCORE: 20
CONCENTRATING,_REMEMBERING_OR_MAKING_DECISIONS_DIFFICULTY: NO
DRESSING/BATHING_DIFFICULTY: NO
ADLS_ACUITY_SCORE: 22
ADLS_ACUITY_SCORE: 22
DOING_ERRANDS_INDEPENDENTLY_DIFFICULTY: NO
ADLS_ACUITY_SCORE: 22
ADLS_ACUITY_SCORE: 22
ADLS_ACUITY_SCORE: 20
ADLS_ACUITY_SCORE: 22
DIFFICULTY_COMMUNICATING: NO
WEAR_GLASSES_OR_BLIND: YES
WALKING_OR_CLIMBING_STAIRS_DIFFICULTY: NO
ADLS_ACUITY_SCORE: 22
DIFFICULTY_EATING/SWALLOWING: NO
ADLS_ACUITY_SCORE: 20

## 2024-05-01 NOTE — OP NOTE
Lumbar 2 to lumbar 5oblique lateral lumbar interbody fusion with discectomy, lumbar 2 to lumbar 5 posterior minimally invasive pedicle screw placement and posterolateral instrumentation and fusion, transpedicular Bone marrow aspiration through separate incision    Johann Lozano MD Alyssa Haberer, PAC                 REPORT OF OPERATION   PRE-PROCEDURE DIAGNOSIS:  1) L2 to L5 degenerative scoliosis and stenosis and claudication        POST-PROCEDURE DIAGNOSIS:  1) Same as above  PROCEDURE PERFORMED:  1) L2 to [id number] Lateral Lumbar Interbody fusion with discectomy, preparation of the endplate and placement of a titanium bullet cage packed with tri-calcium phosphate soaked in bone marrow anterior to the transverse process in modified prone position, with intraoperative biplanar fluoroscopic imaging and electrophysiological monitoring.  2) L2 to  L5  Posterior minimally invasive pedicle screw placement and posterolateral instrumentation and fusion with system with intraoperative biplanar fluoroscopic imaging and electrophysiological monitoring.  3) Transpedicular Bone marrow aspiration through separate incision        Surgeon: Johann Lozano MD  ASSISTANT:  MARNIE Burgos     This is complex surgery on the lumbar spine and an assistant is needed for safety of the surgery for set up of instrumentation, retraction and closing.     HISTORY:   Please refer to my clinic note for full details, but in short, the patient is a 69 year old female with  prior two decompressions and stenosis  and scoliosis not responding to usual conservative therapy. Patient was set up for the surgery as mentioned above and was taken to surgery as mentioned above after all risks and benefits were explained.     PROCEDURE:  The patient was taken to surgery. After general anesthesia was applied, SCDs and Piedra placed and preoperative antibiotic given. The patient was positioned on the Shayne table and 4 posten  frame in a modified prone  position for ease of access from the left side.   Hips were hyperextended to prevent flatback deformity.     AP and lateral fluoroscopic images are positioned. The patient has been prepped and draped in sterile fashion. Landmarks, including spinal process, transverse process, disk space, endplates and pedicles are identified and marked.   A Jamshidi needle is placed in the upper right pedicle inside of the vertebral body and bone marrow has been aspirated through separate incision to introduce stem cells and mix into the biologics.  Following steps are then taken for each specified level:        L2/L3   Cage size 11 mm high and 30mm long Titanium  L3 L4 Cage size 12mm high and 30mm long Titanium  L4 L5 Cage size 12mm high and 30mm long Titanium     The patient was turned using the rotation of the surgical table so a near direct anterior-lateral approach to the lumbar spine could be achieved. A small incision was then made superior to the mid iliac crest and then using biplanar fluoroscopic visualization, under electrophysiological monitoring and stimulation, we introduced an electrophysiological probe through the retroperitoneal space into the desired discs anterior to the transverse processes and then passed it into the disc space after finding a silent window. The sleeve is retained and the probe is removed, then a K wire is passed sequentially into the disk space. A dilating tube was then passed along this same route. Following this, a working channel was then passed sequentially into the disc spaces. The working channel was manually held in position while a series of disc cleaning tools was passed through the channel to remove the affected discs under clear and direct biplanar fluoroscopic visualization.  We decorticate the vertebral endplates at those segments.[id number]           Arthrodesis of the intervertebral spaces via an anterior retroperitoneal exposure was achieved through [place] and lateral  extraforaminal space. Morselized tri-calcium phosphate soaked in bone marrow was added into the anterior disc space. The working channel was then removed. A TITANIUM interbody tightly packed with morselized tri-calcium phosphate soaked in bone marrow was then inserted into the mid portion of the intervertebral disc space over a K-Wire under biplanar fluoroscopic visualization and intraoperative neuromonitoring. The Interpedicular and intradiscal space was significantly enlarged and disc height was restored towards normal anatomy therefore releasing pressure on the nerve roots. Physiologically the spinal canal and lateral recess as well as foramen were bilaterally decompressed, and all bones were confined to the borders of the disc space   Following steps are then taken for each specified level:     L2  5.5mm Screw size Right 50 Left 50  L3    6.5mm Screw size Right 55  Left 55  L4  7.5mm Screw size Right 50 Left 50  L5  7.5mm Screw size Right 50 Left 50        The posterolateral fusion is initiated after the patient is rotated for a true prone position. The entry point to the pedicle is identified in the AP and lateral views and then the skin incision is injected with local anesthetic. We entered the pedicle with a Jamshidi needle. Over the Jamshidi needle we introduced the K wire into the vertebral body. Additionally, we use a small periosteal decorticator along the screws to refresh the surface of the bone and facet and put some amounts of tri-calcium phosphate soaked in bone marrow for additional stability for the posterolateral fusion. Over the K wire we dilate the muscle with the dilator and insert the pedicle screws bilaterally. Screws are all silent up to 25 MA of stimulation. After screws are all placed, we put the james in place and under fluoroscopic imaging, we lock the james in place and remove the screw tops and then each incision has been closed with 2-0 Vicryl suture and then Steri-Strips were applied.      Final ap and lateral C arm images showed excellent positions of cages and the pedicle screws.            Estimated blood: 100 cc.      DISPOSITION: To PACU with postoperative antibiotics. All counts are correct at the end of the surgery.     Johann Lozano MD

## 2024-05-01 NOTE — ANESTHESIA CARE TRANSFER NOTE
Patient: Suzette PENA Calix    Procedure: Procedure(s):  LUMBAR TWO TO LUMBAR FIVE OBLIQUE LATERAL LUMBAR INTERBODY FUSION       Diagnosis: Other idiopathic scoliosis, lumbar region [M41.26]  Diagnosis Additional Information: No value filed.    Anesthesia Type:   General     Note:    Oropharynx: oral airway in place  Level of Consciousness: drowsy  Oxygen Supplementation: face mask  Level of Supplemental Oxygen (L/min / FiO2): 6 lpm  Independent Airway: airway patency satisfactory and stable  Dentition: dentition unchanged  Vital Signs Stable: post-procedure vital signs reviewed and stable  Report to RN Given: handoff report given  Patient transferred to: PACU  Comments: Patient oral suctioned. Patient with spontaneous respirations and adequate tidal volumes. Patient awake and responsive. Extubated in OR to 6L facemask. To PACU ventilating well. VSS. Report given.  Handoff Report: Identifed the Patient, Identified the Reponsible Provider, Reviewed the pertinent medical history, Discussed the surgical course, Reviewed Intra-OP anesthesia mangement and issues during anesthesia, Set expectations for post-procedure period and Allowed opportunity for questions and acknowledgement of understanding      Vitals:  Vitals Value Taken Time   BP     Temp     Pulse 55 05/01/24 1054   Resp 12 05/01/24 1054   SpO2 100 % 05/01/24 1054   Vitals shown include unfiled device data.    Electronically Signed By: NICANOR Canas CRNA  May 1, 2024  10:55 AM

## 2024-05-01 NOTE — ANESTHESIA PROCEDURE NOTES
Airway       Patient location during procedure: OR       Procedure Start/Stop Times: 5/1/2024 7:44 AM  Staff -        CRNA: Janine Serrato APRN CRNA       Performed By: CRNA  Consent for Airway        Urgency: elective  Indications and Patient Condition       Indications for airway management: jose luis-procedural       Induction type:intravenous       Mask difficulty assessment: 1 - vent by mask    Final Airway Details       Final airway type: endotracheal airway       Successful airway: ETT - single  Endotracheal Airway Details        ETT size (mm): 7.0       Cuffed: yes       Cuff volume (mL): 5       Successful intubation technique: direct laryngoscopy       DL Blade Type: Salgado 2       Grade View of Cords: 1       Adjucts: stylet       Position: Center       Measured from: lips       Secured at (cm): 22       Bite block used: None    Post intubation assessment        Placement verified by: capnometry, equal breath sounds and chest rise        Number of attempts at approach: 1       Number of other approaches attempted: 0       Secured with: tape       Ease of procedure: easy       Dentition: Intact and Unchanged    Medication(s) Administered   Medication Administration Time: 5/1/2024 7:44 AM

## 2024-05-01 NOTE — ANESTHESIA POSTPROCEDURE EVALUATION
Patient: Suzette PENA Calix    Procedure: Procedure(s):  LUMBAR TWO TO LUMBAR FIVE OBLIQUE LATERAL LUMBAR INTERBODY FUSION       Anesthesia Type:  General    Note:     Postop Pain Control: Uneventful            Sign Out: Well controlled pain   PONV: No   Neuro/Psych: Uneventful            Sign Out: Acceptable/Baseline neuro status   Airway/Respiratory: Uneventful            Sign Out: Acceptable/Baseline resp. status   CV/Hemodynamics: Uneventful            Sign Out: Acceptable CV status   Other NRE: NONE   DID A NON-ROUTINE EVENT OCCUR? No           Last vitals:  Vitals Value Taken Time   /47 05/01/24 1130   Temp 96.7  F (35.9  C) 05/01/24 1100   Pulse 61 05/01/24 1142   Resp 10 05/01/24 1142   SpO2 95 % 05/01/24 1142   Vitals shown include unfiled device data.    Electronically Signed By: Levon Ledezma MD  May 1, 2024  11:43 AM

## 2024-05-01 NOTE — LETTER
KAVITA Alcantar, [facility]  Inpatient Care Coordination  [facility]  [phone]

## 2024-05-01 NOTE — LETTER
KAVITA Alcantar, [facility]  Inpatient Care Coordination  [facility]  [phone]

## 2024-05-01 NOTE — BRIEF OP NOTE
[facility] Brief Operative Note    Pre-operative diagnosis: Other idiopathic scoliosis, lumbar region [M41.26]   Post-operative diagnosis * No post-op diagnosis entered *  L2 to L5 stenosis and scoliosis and claudication and two prior decompressions   Procedure: Procedure(s):  LUMBAR TWO TO LUMBAR FIVE OBLIQUE LATERAL LUMBAR INTERBODY FUSION   Surgeon(s): Surgeons and Role:     * Johann Lozano MD - Primary     * Cassadnra Robison PA-C - Assisting   Estimated blood loss: 100 mL    Specimens: * No specimens in log *   Findings: See op note

## 2024-05-01 NOTE — LETTER
KAVITA Alcantar, [facility]  Inpatient Care Coordination  [facility]  [phone]

## 2024-05-01 NOTE — CONSULTS
[facility]  Consult Note - Hospitalist Service  Date of Admission:  5/1/2024  Consult Requested by: Dr Orr   Reason for Consult: post-op medial management     Assessment & Plan   Suzette Calix is a 69 year old female admitted on 5/1/2024.She underwent spinal surgery as below    PROCEDURE PERFORMED:  1) L2 to L5 Oblique Lateral Lumbar Interbody fusion with discectomy, preparation of the endplate and placement of a titanium bullet cage packed with tri-calcium phosphate soaked in bone marrow anterior to the transverse process in modified prone position, with intraoperative biplanar fluoroscopic imaging and electrophysiological monitoring.  2) L2 to  L5  Posterior minimally invasive pedicle screw placement and posterolateral instrumentation and fusion with system with intraoperative biplanar fluoroscopic imaging and electrophysiological monitoring.  3) Transpedicular Bone marrow aspiration through separate incision    Hospitalist service was consulted for postoperative medical management.    Assessment and recommendations    #1.  Chronic back pain secondary to L2-5 degenerative scoliosis and stenosis and claudication  -- Postoperative day 0 following above procedure  -- Postoperatively she appears to have some restlessness and confusion likely from anesthesia.  --- Will continue postoperative care including close monitoring of her mental status, vitals and oxygenation    #2.  Hypertension  -- Chronic hypertension on home amlodipine 10 mg once a day  -- Will resume with parameters    #3.  GERD on omeprazole, continued    #4.  Hyperlipidemia on atorvastatin, continued     #5.  Anxiety, depression-Home medications resumed    #6.  Suspected dementia: Continue home Aricept    #7.  Acute postoperative hypoxia secondary to atelectasis  -- Incentive spirometry, supplemental oxygen as needed       Clinically Significant Risk Factors Present on Admission                       # Overweight: Estimated body  "mass index is 26.25 kg/m  as calculated from the following:    Height as of this encounter: 1.613 m (5' 3.5\").    Weight as of this encounter: 68.3 kg (150 lb 9.2 oz).              Miguel Henriquez MD  Hospitalist Service  Securely message with [employer] (more info)  Text page via [facility] Paging/Directory   ______________________________________________________________________    Chief Complaint     Postoperative consult for medical management.  History is obtained from the patient, electronic health record, and patient's spouse    History of Present Illness   Suzette Calix is a 69 year old female who was admitted for elective back surgery.  Her surgery was done today.  Patient is a little confused postoperative  from anesthesia.  History was obtained from multiple sources.  No complaint of chest pain or shortness of breath.  History and physical completed for preoperative evaluation was reviewed.  Home medications reviewed.  Care plan discussed with bedside nurse    Past Medical History    Past Medical History:   Diagnosis Date    Depression     Gastroesophageal reflux disease     Hypertension     Migraine     Other chronic pain     Scoliosis        Past Surgical History   Past Surgical History:   Procedure Laterality Date    APPENDECTOMY      BACK SURGERY Bilateral 09/29/2023    L3-L5 decompression, laminectomy    ENT SURGERY      tonsillectomy/adeniodectomy    EYE SURGERY      GI SURGERY  09/10/2012    EGD x4 in 2011/2012    LUMBAR DISCECTOMY Right 07/14/2022    L2-L3 hemilaminotomy, diskectomy       Medications   I have reviewed this patient's current medications       Review of Systems    Review of systems not obtained due to patient factors - confusion     Physical Exam   Vital Signs: Temp: 97.5  F (36.4  C) Temp src: Temporal BP: 116/59 Pulse: 65   Resp: 14 SpO2: 94 % O2 Device: None (Room air) Oxygen Delivery: 2 LPM  Weight: 150 lbs 9.19 oz    Constitutional: awake, alert, and no apparent distress  ENT: " "normocephalic, without obvious abnormality, atraumatic  Respiratory: no increased work of breathing, good air exchange, no retractions, and clear to auscultation  Cardiovascular: normal apical pulses  and normal S1 and S2  GI: normal bowel sounds, soft, non-distended, and non-tender  Neurologic: Mental Status Exam:  Level of Alertness:   awake  Neuropsychiatric: Level of consciousness: alert / normal and confused   Orientation: oriented only to self    Medical Decision Making           Data     All laboratory and imaging data in the past 24 hours reviewed     Recent Labs   Lab 05/01/24  0653   HGB 13.4     No results for input(s): \"CULT\" in the last 168 hours.  Recent Labs   Lab 05/01/24  0601   GLC 88       Recent Labs   Lab 05/01/24  0601   GLC 88           No results for input(s): \"INR\" in the last 168 hours.        No results for input(s): \"TROPONIN\", \"TROPI\", \"TROPR\" in the last 168 hours.    Invalid input(s): \"TROP\", \"TROPONINIES\"    Recent Results (from the past 48 hour(s))   XR Surgery JACOBO L/T 5 Min Fluoro w Stills    Narrative    This exam was marked as non-reportable because it will not be read by a   radiologist or a [place] non-radiologist provider.                 "

## 2024-05-01 NOTE — PLAN OF CARE
"Arrived from PACU @1300, weaned to 2.0L O2, capno in place, VSS. Morrison in place until tomorrow morning. Pt restless and confused.  states that this always happens after anesthesia and pt has very low pain tolerance. IV toradol, robaxin and tylenol given. CMS intact, dressing CDI. Awaiting hospitalist consult. PT/OT/SW ordered. Plan is home with  when ready.  Problem: Adult Inpatient Plan of Care  Goal: Plan of Care Review  Description: The Plan of Care Review/Shift note should be completed every shift.  The Outcome Evaluation is a brief statement about your assessment that the patient is improving, declining, or no change.  This information will be displayed automatically on your shift  note.  Outcome: Progressing  Flowsheets (Taken 5/1/2024 1503)  Outcome Evaluation: arrived from PACU @ 1300, capno in place, VSS,  at bedside, morrison in place.  Plan of Care Reviewed With:   patient   spouse  Overall Patient Progress: improving  Goal: Patient-Specific Goal (Individualized)  Description: You can add care plan individualizations to a care plan. Examples of Individualization might be:  \"Parent requests to be called daily at 9am for status\", \"I have a hard time hearing out of my right ear\", or \"Do not touch me to wake me up as it startles  me\".  Outcome: Progressing  Goal: Absence of Hospital-Acquired Illness or Injury  Outcome: Progressing  Intervention: Identify and Manage Fall Risk  Recent Flowsheet Documentation  Taken 5/1/2024 1400 by Shari Charlton RN  Safety Promotion/Fall Prevention:   activity supervised   increase visualization of patient   room near nurse's station   safety round/check completed   supervised activity  Intervention: Prevent Skin Injury  Recent Flowsheet Documentation  Taken 5/1/2024 1400 by Shari Charlton RN  Body Position: position changed independently  Skin Protection: adhesive use limited  Device Skin Pressure Protection: absorbent pad utilized/changed  Taken " 5/1/2024 1323 by Shari Charlton RN  Body Position:   position changed independently   supine, head elevated  Intervention: Prevent and Manage VTE (Venous Thromboembolism) Risk  Recent Flowsheet Documentation  Taken 5/1/2024 1400 by Shari Charlton RN  VTE Prevention/Management: SCDs (sequential compression devices) on  Intervention: Prevent Infection  Recent Flowsheet Documentation  Taken 5/1/2024 1400 by Shari Charlton RN  Infection Prevention: single patient room provided  Goal: Optimal Comfort and Wellbeing  Outcome: Progressing  Intervention: Monitor Pain and Promote Comfort  Recent Flowsheet Documentation  Taken 5/1/2024 1323 by Shari Charlton RN  Pain Management Interventions:   cold applied   medication (see MAR)   emotional support   quiet environment facilitated   relaxation techniques promoted   repositioned   rest  Goal: Readiness for Transition of Care  Outcome: Progressing  Intervention: Mutually Develop Transition Plan  Recent Flowsheet Documentation  Taken 5/1/2024 1300 by Shari Charlton RN  Equipment Currently Used at Home: none     Problem: Spinal Surgery  Goal: Optimal Coping with Surgery  Outcome: Progressing  Goal: Absence of Bleeding  Outcome: Progressing  Intervention: Monitor and Manage Bleeding  Recent Flowsheet Documentation  Taken 5/1/2024 1400 by Shari Charlton RN  Bleeding Management: dressing monitored  Goal: Effective Bowel Elimination  Outcome: Progressing  Goal: Fluid and Electrolyte Balance  Outcome: Progressing  Goal: Optimal Functional Ability  Outcome: Progressing  Intervention: Optimize Functional Status  Recent Flowsheet Documentation  Taken 5/1/2024 1323 by Shari Charlton RN  Activity Management: activity adjusted per tolerance  Goal: Absence of Infection Signs and Symptoms  Outcome: Progressing  Intervention: Prevent or Manage Infection  Recent Flowsheet Documentation  Taken 5/1/2024 1400 by Shari Charlton RN  Infection Prevention: single patient room  provided  Infection Management: aseptic technique maintained  Goal: Optimal Neurologic Function  Outcome: Progressing  Intervention: Optimize Neurologic Function  Recent Flowsheet Documentation  Taken 5/1/2024 1400 by Shari Charlton RN  Body Position: position changed independently  Taken 5/1/2024 1323 by Shari Charlton RN  Body Position:   position changed independently   supine, head elevated  Goal: Anesthesia/Sedation Recovery  Outcome: Progressing  Intervention: Optimize Anesthesia Recovery  Recent Flowsheet Documentation  Taken 5/1/2024 1400 by Shari Charlton RN  Safety Promotion/Fall Prevention:   activity supervised   increase visualization of patient   room near nurse's station   safety round/check completed   supervised activity  Administration (IS): unable to perform  Goal: Optimal Pain Control and Function  Outcome: Progressing  Intervention: Prevent or Manage Pain  Recent Flowsheet Documentation  Taken 5/1/2024 1323 by Shari Charlton RN  Pain Management Interventions:   cold applied   medication (see MAR)   emotional support   quiet environment facilitated   relaxation techniques promoted   repositioned   rest  Goal: Nausea and Vomiting Relief  Outcome: Progressing  Intervention: Prevent or Manage Nausea and Vomiting  Recent Flowsheet Documentation  Taken 5/1/2024 1400 by Shari Charlton RN  Nausea/Vomiting Interventions:   sips of clear liquids given   nausea triggers minimized  Goal: Effective Urinary Elimination  Outcome: Progressing  Goal: Effective Oxygenation and Ventilation  Outcome: Progressing  Intervention: Optimize Oxygenation and Ventilation  Recent Flowsheet Documentation  Taken 5/1/2024 1400 by Shari Charlton RN  Head of Bed (HOB) Positioning: HOB at 20 degrees  Taken 5/1/2024 1323 by Shari Charlton RN  Head of Bed (HOB) Positioning: HOB at 20 degrees   Goal Outcome Evaluation:      Plan of Care Reviewed With: patient, spouse    Overall Patient Progress: improvingOverall  Patient Progress: improving    Outcome Evaluation: arrived from PACU @ 1300, capno in place, VSS,  at bedside, morrison in place.

## 2024-05-01 NOTE — PROGRESS NOTES
Patient chewing gum right as she came into pre-op. NST had her spit it out.  JUSTIN Ledezma updated and aware.    Twyla Silveira RN on 5/1/2024 at 7:02 AM

## 2024-05-01 NOTE — PROGRESS NOTES
Inpatient pharmacist called about specific mg of TXA IV for patient's kg. Inpatient pharmacist said to use pump and use the 1g stocked in our pyxis.    Twyla Silveira RN on 5/1/2024 at 7:05 AM

## 2024-05-01 NOTE — PROVIDER NOTIFICATION
Did provider choose to remove indwelling morrison catheter? NO    Provider's morrison indication for keeping indwelling morrison catheter: Indication for continued use: Other - will remain in place until the morning of POD1 following lumbar fusion    Is there an order for indwelling morrison catheter? YES

## 2024-05-01 NOTE — LETTER
KAVITA Alcantar, [facility]  Inpatient Care Coordination  [facility]  [phone]

## 2024-05-02 ENCOUNTER — APPOINTMENT (OUTPATIENT)
Dept: OCCUPATIONAL THERAPY | Facility: CLINIC | Age: 69
DRG: 454 | End: 2024-05-02
Attending: ORTHOPAEDIC SURGERY
Payer: MEDICARE

## 2024-05-02 ENCOUNTER — APPOINTMENT (OUTPATIENT)
Dept: PHYSICAL THERAPY | Facility: CLINIC | Age: 69
DRG: 454 | End: 2024-05-02
Attending: ORTHOPAEDIC SURGERY
Payer: MEDICARE

## 2024-05-02 LAB
ANION GAP SERPL CALCULATED.3IONS-SCNC: 7 MMOL/L (ref 7–15)
BASOPHILS # BLD AUTO: 0 10E3/UL (ref 0–0.2)
BASOPHILS NFR BLD AUTO: 0 %
BUN SERPL-MCNC: 18.4 MG/DL (ref 8–23)
CALCIUM SERPL-MCNC: 8.1 MG/DL (ref 8.8–10.2)
CHLORIDE SERPL-SCNC: 106 MMOL/L (ref 98–107)
CREAT SERPL-MCNC: 0.77 MG/DL (ref 0.51–0.95)
DEPRECATED HCO3 PLAS-SCNC: 25 MMOL/L (ref 22–29)
EGFRCR SERPLBLD CKD-EPI 2021: 83 ML/MIN/1.73M2
EOSINOPHIL # BLD AUTO: 0 10E3/UL (ref 0–0.7)
EOSINOPHIL NFR BLD AUTO: 0 %
ERYTHROCYTE [DISTWIDTH] IN BLOOD BY AUTOMATED COUNT: 13.7 % (ref 10–15)
GLUCOSE SERPL-MCNC: 119 MG/DL (ref 70–99)
HCT VFR BLD AUTO: 29.1 % (ref 35–47)
HGB BLD-MCNC: 9.8 G/DL (ref 11.7–15.7)
IMM GRANULOCYTES # BLD: 0.1 10E3/UL
IMM GRANULOCYTES NFR BLD: 1 %
LYMPHOCYTES # BLD AUTO: 1.9 10E3/UL (ref 0.8–5.3)
LYMPHOCYTES NFR BLD AUTO: 16 %
MCH RBC QN AUTO: 30.1 PG (ref 26.5–33)
MCHC RBC AUTO-ENTMCNC: 33.7 G/DL (ref 31.5–36.5)
MCV RBC AUTO: 89 FL (ref 78–100)
MONOCYTES # BLD AUTO: 0.9 10E3/UL (ref 0–1.3)
MONOCYTES NFR BLD AUTO: 8 %
NEUTROPHILS # BLD AUTO: 9.2 10E3/UL (ref 1.6–8.3)
NEUTROPHILS NFR BLD AUTO: 75 %
NRBC # BLD AUTO: 0 10E3/UL
NRBC BLD AUTO-RTO: 0 /100
PLATELET # BLD AUTO: 193 10E3/UL (ref 150–450)
POTASSIUM SERPL-SCNC: 3.8 MMOL/L (ref 3.4–5.3)
RBC # BLD AUTO: 3.26 10E6/UL (ref 3.8–5.2)
SODIUM SERPL-SCNC: 138 MMOL/L (ref 135–145)
WBC # BLD AUTO: 12.2 10E3/UL (ref 4–11)

## 2024-05-02 PROCEDURE — 250N000013 HC RX MED GY IP 250 OP 250 PS 637: Performed by: INTERNAL MEDICINE

## 2024-05-02 PROCEDURE — 250N000011 HC RX IP 250 OP 636

## 2024-05-02 PROCEDURE — 97116 GAIT TRAINING THERAPY: CPT | Mod: GP | Performed by: PHYSICAL THERAPIST

## 2024-05-02 PROCEDURE — 97535 SELF CARE MNGMENT TRAINING: CPT | Mod: GO

## 2024-05-02 PROCEDURE — 97165 OT EVAL LOW COMPLEX 30 MIN: CPT | Mod: GO

## 2024-05-02 PROCEDURE — 99232 SBSQ HOSP IP/OBS MODERATE 35: CPT | Performed by: INTERNAL MEDICINE

## 2024-05-02 PROCEDURE — 36415 COLL VENOUS BLD VENIPUNCTURE: CPT

## 2024-05-02 PROCEDURE — 97161 PT EVAL LOW COMPLEX 20 MIN: CPT | Mod: GP | Performed by: PHYSICAL THERAPIST

## 2024-05-02 PROCEDURE — 80048 BASIC METABOLIC PNL TOTAL CA: CPT

## 2024-05-02 PROCEDURE — 97530 THERAPEUTIC ACTIVITIES: CPT | Mod: GP | Performed by: PHYSICAL THERAPIST

## 2024-05-02 PROCEDURE — 85025 COMPLETE CBC W/AUTO DIFF WBC: CPT

## 2024-05-02 PROCEDURE — 250N000013 HC RX MED GY IP 250 OP 250 PS 637

## 2024-05-02 PROCEDURE — 120N000001 HC R&B MED SURG/OB

## 2024-05-02 RX ORDER — SENNA AND DOCUSATE SODIUM 50; 8.6 MG/1; MG/1
1 TABLET, FILM COATED ORAL AT BEDTIME
Qty: 10 TABLET | Refills: 0 | Status: SHIPPED | OUTPATIENT
Start: 2024-05-02

## 2024-05-02 RX ORDER — HYDROXYZINE HYDROCHLORIDE 10 MG/1
10 TABLET, FILM COATED ORAL 3 TIMES DAILY PRN
Qty: 90 TABLET | Refills: 0 | Status: SHIPPED | OUTPATIENT
Start: 2024-05-02

## 2024-05-02 RX ORDER — METHOCARBAMOL 750 MG/1
750 TABLET, FILM COATED ORAL 4 TIMES DAILY PRN
Qty: 90 TABLET | Refills: 0 | Status: SHIPPED | OUTPATIENT
Start: 2024-05-02

## 2024-05-02 RX ORDER — OXYCODONE HYDROCHLORIDE 5 MG/1
5 TABLET ORAL EVERY 4 HOURS PRN
Qty: 42 TABLET | Refills: 0 | Status: SHIPPED | OUTPATIENT
Start: 2024-05-02 | End: 2024-05-03

## 2024-05-02 RX ADMIN — SENNOSIDES AND DOCUSATE SODIUM 1 TABLET: 8.6; 5 TABLET ORAL at 09:20

## 2024-05-02 RX ADMIN — ACETAMINOPHEN 1000 MG: 500 TABLET, FILM COATED ORAL at 02:11

## 2024-05-02 RX ADMIN — NADOLOL 40 MG: 20 TABLET ORAL at 09:19

## 2024-05-02 RX ADMIN — HYDROXYZINE HYDROCHLORIDE 10 MG: 10 TABLET ORAL at 05:55

## 2024-05-02 RX ADMIN — QUETIAPINE FUMARATE 12.5 MG: 25 TABLET ORAL at 19:56

## 2024-05-02 RX ADMIN — METHOCARBAMOL 750 MG: 750 TABLET ORAL at 02:12

## 2024-05-02 RX ADMIN — DONEPEZIL HYDROCHLORIDE 5 MG: 5 TABLET ORAL at 21:42

## 2024-05-02 RX ADMIN — KETOROLAC TROMETHAMINE 15 MG: 15 INJECTION, SOLUTION INTRAMUSCULAR; INTRAVENOUS at 05:04

## 2024-05-02 RX ADMIN — CETIRIZINE HYDROCHLORIDE 10 MG: 10 TABLET, FILM COATED ORAL at 09:19

## 2024-05-02 RX ADMIN — HYDROXYZINE HYDROCHLORIDE 10 MG: 10 TABLET ORAL at 00:02

## 2024-05-02 RX ADMIN — SENNOSIDES AND DOCUSATE SODIUM 1 TABLET: 8.6; 5 TABLET ORAL at 19:56

## 2024-05-02 RX ADMIN — HYDROMORPHONE HYDROCHLORIDE 0.2 MG: 0.2 INJECTION, SOLUTION INTRAMUSCULAR; INTRAVENOUS; SUBCUTANEOUS at 21:45

## 2024-05-02 RX ADMIN — CEFAZOLIN 1 G: 1 INJECTION, POWDER, FOR SOLUTION INTRAMUSCULAR; INTRAVENOUS at 06:38

## 2024-05-02 RX ADMIN — ATORVASTATIN CALCIUM 20 MG: 20 TABLET, FILM COATED ORAL at 21:42

## 2024-05-02 RX ADMIN — PANTOPRAZOLE SODIUM 40 MG: 40 TABLET, DELAYED RELEASE ORAL at 06:38

## 2024-05-02 RX ADMIN — AMLODIPINE BESYLATE 5 MG: 5 TABLET ORAL at 09:20

## 2024-05-02 RX ADMIN — METHOCARBAMOL 750 MG: 750 TABLET ORAL at 09:20

## 2024-05-02 RX ADMIN — METHOCARBAMOL 750 MG: 750 TABLET ORAL at 18:33

## 2024-05-02 RX ADMIN — RALOXIFENE HYDROCHLORIDE 60 MG: 60 TABLET, FILM COATED ORAL at 09:20

## 2024-05-02 RX ADMIN — HYDROMORPHONE HYDROCHLORIDE 0.2 MG: 0.2 INJECTION, SOLUTION INTRAMUSCULAR; INTRAVENOUS; SUBCUTANEOUS at 05:55

## 2024-05-02 RX ADMIN — POLYETHYLENE GLYCOL 3350 17 G: 17 POWDER, FOR SOLUTION ORAL at 09:21

## 2024-05-02 RX ADMIN — KETOROLAC TROMETHAMINE 15 MG: 15 INJECTION, SOLUTION INTRAMUSCULAR; INTRAVENOUS at 21:42

## 2024-05-02 RX ADMIN — KETOROLAC TROMETHAMINE 15 MG: 15 INJECTION, SOLUTION INTRAMUSCULAR; INTRAVENOUS at 14:59

## 2024-05-02 RX ADMIN — OXYCODONE HYDROCHLORIDE 5 MG: 5 TABLET ORAL at 00:02

## 2024-05-02 RX ADMIN — HYDROXYZINE HYDROCHLORIDE 10 MG: 10 TABLET ORAL at 19:56

## 2024-05-02 RX ADMIN — ACETAMINOPHEN 1000 MG: 500 TABLET, FILM COATED ORAL at 14:59

## 2024-05-02 RX ADMIN — SERTRALINE HYDROCHLORIDE 100 MG: 100 TABLET ORAL at 09:20

## 2024-05-02 ASSESSMENT — ACTIVITIES OF DAILY LIVING (ADL)
ADLS_ACUITY_SCORE: 24
ADLS_ACUITY_SCORE: 24
ADLS_ACUITY_SCORE: 23
ADLS_ACUITY_SCORE: 24
ADLS_ACUITY_SCORE: 23
ADLS_ACUITY_SCORE: 25
ADLS_ACUITY_SCORE: 23
ADLS_ACUITY_SCORE: 24
ADLS_ACUITY_SCORE: 24
ADLS_ACUITY_SCORE: 23
ADLS_ACUITY_SCORE: 24
ADLS_ACUITY_SCORE: 28
ADLS_ACUITY_SCORE: 24
ADLS_ACUITY_SCORE: 23
ADLS_ACUITY_SCORE: 23
DEPENDENT_IADLS:: COOKING;TRANSPORTATION
ADLS_ACUITY_SCORE: 23
ADLS_ACUITY_SCORE: 28

## 2024-05-02 NOTE — PROGRESS NOTES
[facility]  Hospitalist Progress Note  Miguel Henriquez M.D., M.B.A.   05/02/2024    Reason for Stay/active problem list    Elective back surgery         Assessment and Plan:        Summary of Stay: Suzette Calix is a 69 year old female admitted on 5/1/2024.She underwent spinal surgery as below     PROCEDURE PERFORMED:  1) L2 to L5 Oblique Lateral Lumbar Interbody fusion with discectomy, preparation of the endplate and placement of a titanium bullet cage packed with tri-calcium phosphate soaked in bone marrow anterior to the transverse process in modified prone position, with intraoperative biplanar fluoroscopic imaging and electrophysiological monitoring.  2) L2 to  L5  Posterior minimally invasive pedicle screw placement and posterolateral instrumentation and fusion with system with intraoperative biplanar fluoroscopic imaging and electrophysiological monitoring.  3) Transpedicular Bone marrow aspiration through separate incision     Hospitalist service was consulted for postoperative medical management.      Problem List with Assessment and Plan:    #1.  Chronic back pain secondary to L2-5 degenerative scoliosis and stenosis and claudication  -- Postoperative day #1 following above procedure  -- His confusion is improving postoperatively.  --- Will continue postoperative care including close monitoring of her mental status, vitals and risk of delirium     #2.  Hypertension  -- Chronic hypertension on home amlodipine 10 mg once a day  -- Resumed     #3.  GERD on omeprazole, continued     #4.  Hyperlipidemia on atorvastatin, continued      #5.  Anxiety, depression-Home medications resumed     #6.  Suspected dementia: Continue home Aricept  --Patient has diagnosis of dementia and appears to have some confusion intermittently.  She is at risk of increased encephalopathy needs to be closely monitored.  As needed Seroquel for agitation.     #7.  Acute postoperative hypoxia secondary to  "atelectasis  -- Incentive spirometry, supplemental oxygen as needed    #8.  Generalized weakness and postoperative status  -- PT and OT service consulted.  Patient may benefit from rehabilitation.   consulted    #9.  Acute postoperative blood loss anemia  -- Preop hemoglobin was 13.4.  Hemoglobin dropped to 9.8.  Will continue to monitor hemoglobin and transfuse as needed        VTE Prophylaxis: Defer to primary service  Code Status: Full Code  Diet: Advance Diet as Tolerated: Regular Diet Adult  Diet    Piedra Catheter: Not present    Family updated today: Yes      Medically Ready for Discharge: Anticipate discharge to TCU in the next 1 or 2 days pending progress            Interval History (Subjective):        Patient is seen and examined by me today and medical record reviewed.Overnight events noted and care discussed with nursing staff.  Patient feels better today.  Her  was at bedside.  She is occasionally confused but she was alert and oriented x 3 when I saw her.  However, patient was weak and unable to discharge home today.  I spoke with the patient's nurse at bedside.       Physical Exam:        Last Vital Signs:  BP (!) 129/39 (BP Location: Right arm)   Pulse 64   Temp 97.6  F (36.4  C) (Temporal)   Resp 20   Ht 1.613 m (5' 3.5\")   Wt 68.3 kg (150 lb 9.2 oz)   SpO2 100%   BMI 26.25 kg/m      I/O last 3 completed shifts:  In: 240 [P.O.:240]  Out: 430 [Urine:430]    Wt Readings from Last 5 Encounters:   05/01/24 68.3 kg (150 lb 9.2 oz)        Constitutional: Awake, alert, cooperative, no apparent distress     Respiratory: Clear to auscultation bilaterally, no crackles or wheezing   Cardiovascular: Regular rate and rhythm, normal S1 and S2, and no murmur noted   Abdomen: Normal bowel sounds, soft, non-distended, non-tender   Skin: No new rashes, no cyanosis, dry to touch   Neuro: Alert with  no new focal weakness   Extremities: No edema   Other(s):        All other systems: Negative " "         Medications:        All current medications were reviewed with changes reflected in problem list.         Data:      All new lab and imaging data was reviewed.      Data reviewed today: I reviewed all new labs and imaging results over the last 24 hours. I personally reviewed no images or EKG's today      Recent Labs   Lab 05/02/24  0709 05/01/24  0653   WBC 12.2*  --    HGB 9.8* 13.4   HCT 29.1*  --    MCV 89  --      --      No results for input(s): \"CULT\" in the last 168 hours.  Recent Labs   Lab 05/02/24  0709 05/01/24  0601     --    POTASSIUM 3.8  --    CHLORIDE 106  --    CO2 25  --    ANIONGAP 7  --    * 88   BUN 18.4  --    CR 0.77  --    GFRESTIMATED 83  --    NAHUM 8.1*  --        Recent Labs   Lab 05/02/24  0709 05/01/24  0601   * 88       No results for input(s): \"INR\" in the last 168 hours.      No results for input(s): \"TROPONIN\", \"TROPI\", \"TROPR\" in the last 168 hours.    Invalid input(s): \"TROP\", \"TROPONINIES\"    Recent Results (from the past 48 hour(s))   XR Surgery JACOBO L/T 5 Min Fluoro w Stills    Narrative    This exam was marked as non-reportable because it will not be read by a   radiologist or a [facility] non-radiologist provider.             COVID Status:  COVID-19 PCR Results           No data to display              COVID-19 Antibody Results, Testing for Immunity           No data to display                 Disclaimer: This note consists of symbols derived from keyboarding, dictation and/or voice recognition software. As a result, there may be errors in the script that have gone undetected. Please consider this when interpreting information found in this chart.    "

## 2024-05-02 NOTE — PLAN OF CARE
"Goal Outcome Evaluation:      Plan of Care Reviewed With: patient    Overall Patient Progress: improvingOverall Patient Progress: improving    Outcome Evaluation: Pt is alert to self, pt set up on the edge of bed x2. dressing to lower back cdi. IV Dilaudid given x1. Pt became more confused after prn Oxycodone. Piedra removed at 6 am. SL.    Patient vital signs are at baseline: Yes  Patient able to ambulate as they were prior to admission or with assist devices provided by therapies during their stay:  No,  Reason:  needs assist of 2 with transfers, pt moves ind in bed  Patient MUST void prior to discharge:  No,  Reason:  due to void  Patient able to tolerate oral intake:  Yes  Pain has adequate pain control using Oral analgesics:  No,  Reason:  IV Dilaudid given x1  Does patient have an identified :  Yes  Has goal D/C date and time been discussed with patient:  No,  Reason:  needs PT assessment.       Problem: Adult Inpatient Plan of Care  Goal: Plan of Care Review  Description: The Plan of Care Review/Shift note should be completed every shift.  The Outcome Evaluation is a brief statement about your assessment that the patient is improving, declining, or no change.  This information will be displayed automatically on your shift  note.  Outcome: Progressing  Flowsheets (Taken 5/2/2024 0738)  Outcome Evaluation: Pt is alert to self, pt set up on the edge of bed x2. dressing to lower back cdi. IV Dilaudid given x1. Pt became more confused after prn Oxycodone. Piedra removed at 6 am. SL.  Plan of Care Reviewed With: patient  Overall Patient Progress: improving  Goal: Patient-Specific Goal (Individualized)  Description: You can add care plan individualizations to a care plan. Examples of Individualization might be:  \"Parent requests to be called daily at 9am for status\", \"I have a hard time hearing out of my right ear\", or \"Do not touch me to wake me up as it startles  me\".  Outcome: Progressing  Goal: Absence of " Hospital-Acquired Illness or Injury  Outcome: Progressing  Intervention: Identify and Manage Fall Risk  Recent Flowsheet Documentation  Taken 5/2/2024 0100 by Gwendolyn Mayes RN  Safety Promotion/Fall Prevention:   activity supervised   increase visualization of patient   room near nurse's station   safety round/check completed   supervised activity  Intervention: Prevent and Manage VTE (Venous Thromboembolism) Risk  Recent Flowsheet Documentation  Taken 5/2/2024 0100 by Gwendolyn Mayes RN  VTE Prevention/Management: SCDs (sequential compression devices) on  Intervention: Prevent Infection  Recent Flowsheet Documentation  Taken 5/2/2024 0100 by Gwendolyn Mayes RN  Infection Prevention: single patient room provided  Goal: Optimal Comfort and Wellbeing  Outcome: Progressing  Intervention: Monitor Pain and Promote Comfort  Recent Flowsheet Documentation  Taken 5/2/2024 0610 by Gwendolyn Mayes RN  Pain Management Interventions: medication (see MAR)  Taken 5/2/2024 0256 by Gwendolyn Mayes RN  Pain Management Interventions: medication (see MAR)  Taken 5/2/2024 0047 by Gwendolyn Mayes RN  Pain Management Interventions: medication (see MAR)  Goal: Readiness for Transition of Care  Outcome: Progressing     Problem: Spinal Surgery  Goal: Optimal Coping with Surgery  Outcome: Progressing  Goal: Absence of Bleeding  Outcome: Progressing  Goal: Effective Bowel Elimination  Outcome: Progressing  Goal: Fluid and Electrolyte Balance  Outcome: Progressing  Goal: Optimal Functional Ability  Outcome: Progressing  Intervention: Optimize Functional Status  Recent Flowsheet Documentation  Taken 5/2/2024 0100 by Gwendolyn Mayes RN  Activity Management: activity adjusted per tolerance  Goal: Absence of Infection Signs and Symptoms  Outcome: Progressing  Intervention: Prevent or Manage Infection  Recent Flowsheet Documentation  Taken 5/2/2024 0100 by Gwendolyn Mayes RN  Infection Prevention: single patient room provided  Goal: Optimal Neurologic  Function  Outcome: Progressing  Intervention: Optimize Neurologic Function  Recent Flowsheet Documentation  Taken 5/2/2024 0100 by Gwendolyn Mayes RN  Range of Motion: active ROM (range of motion) encouraged  Goal: Anesthesia/Sedation Recovery  Outcome: Progressing  Intervention: Optimize Anesthesia Recovery  Recent Flowsheet Documentation  Taken 5/2/2024 0100 by Gwendolyn Mayes, JOAN  Safety Promotion/Fall Prevention:   activity supervised   increase visualization of patient   room near nurse's station   safety round/check completed   supervised activity  Goal: Optimal Pain Control and Function  Outcome: Progressing  Intervention: Prevent or Manage Pain  Recent Flowsheet Documentation  Taken 5/2/2024 0610 by Gwendolyn Mayes, RN  Pain Management Interventions: medication (see MAR)  Taken 5/2/2024 0256 by Gwendolyn Mayes RN  Pain Management Interventions: medication (see MAR)  Taken 5/2/2024 0047 by Gwendolyn Mayes, RN  Pain Management Interventions: medication (see MAR)  Goal: Nausea and Vomiting Relief  Outcome: Progressing  Goal: Effective Urinary Elimination  Outcome: Progressing  Goal: Effective Oxygenation and Ventilation  Outcome: Progressing

## 2024-05-02 NOTE — PROGRESS NOTES
DAILY PROGRESS NOTE    Suzette Calix is a 69 year old old female admitted on 5/1/2024  5:37 AM.    Subjective  Suzette is a 69-year-old female who presents today POD1 from an L2-L5 oblique lateral lumbar interbody fusion (OLLIF) performed by Dr. Lozano on May 1, 2024.   She reports quite a bit of pain, rating it as an 8 out of 10. She is currently receiving intravenous Dilaudid and as-needed oral oxycodone for pain management. Suzette denies experiencing any lower extremity symptoms such at burning or numbness, but describes localized low back pain.   She has expressed concerns about her care during the past 24 hours and has a desire to be discharged home to her .   We reviewed the expectations following her recent spine surgery and emphasized the need for her to be medically optimized for a safe return home. 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   We have planned for physical therapy and occupational therapy to evaluate her later today. If she meets their criteria, we will consider her for discharge. Furthermore, she has been instructed to contact the [facility] with any subsequent questions, comments, or concerns after her discharge.    Objective    AAOx3, Piedra out this AM, Neuro Intact, Strength intact    Hemoglobin   Date Value Ref Range Status   05/02/2024 9.8 (L) 11.7 - 15.7 g/dL Final   ]      Impression / Plan  Plan for discharge pending PT/OT clearance     Plan for today:    Patient doing well  Ambulate with help  PT OT, possibly clearance   Full diet  Today  Wean and d/c PCA and transition to PO meds today   Discharge Pending PT/OT    Cassandra Robison PAC

## 2024-05-02 NOTE — DISCHARGE INSTRUCTIONS
Transitional Care Facility:    [address]  [phone]    Description of Procedure:  The oblique lateral lumbar interbody fusion (OLLIF) is a minimally invasive surgery that involves multiple small incisions. With x-ray guidance, the surgeon is able to place screws, rods, and cages. The screws are placed in the vertebrae along with rods that go on either side of the spine to stabilize it. A cage is placed between the vertebrae in the disc space after the disc is removed. Bone growth is encouraged by a specialized material in the cage. Bone will grow to connect the vertebrae, fusing them together. It will take several months for the bone growth to occur, so following the post-op restrictions are very important during this time.    The following guidelines are recommended after surgery to ensure a good recovery. You may be given additional instructions by your surgeon when discharged. For questions or concerns, please contact Dr. Lozano at [phone]    After your surgery:   - You may resume your regular diet as tolerated; avoiding spicy or greasy foods at first.   - Resume all previous medications. Pain medication and antibiotics may cause constipation. Increase your water and fiber intake. Also, over the counter stool softeners may be helpful (Senna, Colace, Milk of Magnesia).   - NO ALCOHOL for the next 24 hours or while taking pain medication(s).  - It is normal to feel sleepy, dizzy and/or slightly nauseous for 24 hours after anesthesia.  - NO driving or operating heavy machinery or make any important or legal decisions or sign legal documents while taking narcotics.    Restrictions:  Following surgery, you will be asked to wear your brace with activity and in the car for the first 3 months. During this period, remember the acronym  BLT.  That is, you should not bend, lift over 8 pounds, or twist. You should try to keep your back (hips to shoulders) as straight as  possible. In addition, follow these lifting restrictions:  1 month: 8 lbs  2 months: 16 lbs  4 months: 25 lbs  Further guidance will be provided at your follow up appointment.     Incision Care:  You may have staples, sutures, steri-strips, or likely a combination of these to help keep your incision closed post-operatively. Steri-strips are small stickers placed over the wound; please allow these to remain in place until they fall off on their own, typically about 7 days. Staples should be taken out within 10-14 days after surgery by your primary care provider. Often a bandage is placed over the incision site. Please keep this bandage on for 48 hours after surgery, as it is a sterile dressing. After 48 hours, the dressing should be changed daily until there is no longer drainage around 5-7 days post-op. After this, gauze or bandages are usually not necessary. We ask that you leave the incision site open to air. Please remember that dark and moist areas promote bacterial growth.     Signs of Infection:  You or a significant other should monitor your incision for signs of infection. Signs of infection include: redness, swelling, increased warmth to touch, discharge from the wound, and a body temperature over 100.5 F. If any signs of infection do appear, please contact our office.     Can I bathe?  It is safe for the incision to get wet while taking a shower, but avoid any direct pressure from the shower head and do not scrub the area. Lightly pat the site dry with a clean towel when finished. Avoid submerging your wound by soaking in the bathtub, as this will increase your risk of infection at the incision site. Please wait at least 3 weeks for the incision wound to heal before submerging the area while taking baths.     When can I go back to work?  Most patients go back to work with restrictions 4-8 weeks after surgery. If your job is more physically demanding, this may need to be extended. In general, your  capability to return to work will be discussed at your one-month visit.    When can I drive?  Once you are off all narcotic pain medications, and you have regained full control of your extremities you should be able to safely operate your vehicle.     Appointments:  Please see your primary care provider 10-14 days after surgery to have the incision examined. We would like to see you at [facility] one month after surgery. This is the first of a series of post-op appointments. You will have a CT scan prior to your appointment to confirm the position of the hardware. You can reach [facility] to set up an appointment, if it is not yet scheduled.     Things to Remember:  Fluctuation in the amount of pain you may experience is normal. This is especially common 1-2 weeks after surgery related to increased swelling at the surgical site.     Certain things can delay your healing, hinder your surgery from being successful, and/or increase your risk of requiring another surgery. These include smoking and/or tobacco use, diabetes, poor general health, advanced age, and/or obesity. Although some risks cannot be avoided, it is important to control what you can for the best possible outcome.    To reach [facility] providers after-hours with URGENT Post-Operative concerns: [phone] (this number is not monitored during normal business hours).    In Emergency Situations, Call 911:  - Sudden onset of leg weakness and spasms  - Loss of bladder control and/or bowel functions  - Signs or symptoms of a pulmonary embolism (PE) - sudden coughing, sharp chest pain, rapid breathing, or shortness of breath, and/or severe lightheadedness.   - Signs or symptoms of a deep vein thrombosis (DVT) - Swelling in the leg(s), pain and/or tenderness in the leg(s), red or discolored skin on the leg(s), and/or warm skin to the touch.  - Chest pain, trouble breathing, fell & can't get back up

## 2024-05-02 NOTE — PROGRESS NOTES
05/02/24 1200   Appointment Info   Signing Clinician's Name / Credentials (OT) Julia Long, OTD, OTR/L   Rehab Comments (OT) Brace on when OOB, spinal precautions   Living Environment   People in Home spouse   Current Living Arrangements house  (1.5)   Home Accessibility stairs to enter home;stairs within home   Number of Stairs, Main Entrance 3   Stair Railings, Main Entrance other (see comments)  (made rail)   Number of Stairs, Within Home, Primary eight   Stair Railings, Within Home, Primary railings on both sides of stairs   Transportation Anticipated family or friend will provide   Self-Care   Usual Activity Tolerance moderate   Current Activity Tolerance poor   Equipment Currently Used at Home none   Activity/Exercise/Self-Care Comment Pt ind at baseline in basic ADLs, has support from spouse due to baseline dementia.   Instrumental Activities of Daily Living (IADL)   IADL Comments Pt has support from spouse due to baseline dementia.   General Information   Onset of Illness/Injury or Date of Surgery 05/01/24   Referring Physician Cassandra Robison PA-C   Patient/Family Therapy Goal Statement (OT) Have less pain   Additional Occupational Profile Info/Pertinent History of Current Problem S/P LUMBAR TWO TO LUMBAR FIVE OBLIQUE LATERAL LUMBAR INTERBODY FUSION   Existing Precautions/Restrictions fall;brace worn when out of bed;spinal   Cognitive Status Examination   Orientation Status person   Cognitive Status Comments Pt with baseline memory deficits-Dementia.   Visual Perception   Visual Impairment/Limitations WFL   Sensory   Sensory Quick Adds sensation intact   Pain Assessment   Patient Currently in Pain Yes, see Vital Sign flowsheet   Posture   Posture forward head position;protracted shoulders   Range of Motion Comprehensive   Comment, General Range of Motion BUE WFL   Strength Comprehensive (MMT)   Comment, General Manual Muscle Testing (MMT) Assessment BUE WFL, general weakness s/p surgery.   Bed  Mobility   Comment (Bed Mobility) Mod Ax2   Transfers   Transfer Comments Mod Ax2, Casandra stedy   Balance   Balance Comments Unsteady   Clinical Impression   Criteria for Skilled Therapeutic Interventions Met (OT) Yes, treatment indicated   OT Diagnosis Impaired participation in ADLs   Influenced by the following impairments S/p spinal surgery   OT Problem List-Impairments impacting ADL problems related to;activity tolerance impaired;mobility;cognition;strength;pain;post-surgical precautions   Assessment of Occupational Performance 3-5 Performance Deficits   Identified Performance Deficits LB dressing, bathing, toileting, home mgmt   Planned Therapy Interventions (OT) ADL retraining;strengthening;transfer training;home program guidelines;progressive activity/exercise   Clinical Decision Making Complexity (OT) problem focused assessment/low complexity   Risk & Benefits of therapy have been explained evaluation/treatment results reviewed;care plan/treatment goals reviewed;risks/benefits reviewed;current/potential barriers reviewed;participants voiced agreement with care plan;participants included;patient;spouse/significant other   OT Total Evaluation Time   OT Eval, Low Complexity Minutes (31404) 7   OT Goals   Therapy Frequency (OT) Daily   OT Predicted Duration/Target Date for Goal Attainment 05/09/24   OT Goals Lower Body Dressing;Lower Body Bathing;Toilet Transfer/Toileting;Hygiene/Grooming   OT: Hygiene/Grooming supervision/stand-by assist;within precautions   OT: Lower Body Dressing Supervision/stand-by assist;using adaptive equipment   OT: Lower Body Bathing Supervision/stand-by assist;with precautions;using adaptive equipment   OT: Toilet Transfer/Toileting Supervision/stand-by assist;toilet transfer;cleaning and garment management;using adaptive equipment;within precautions   Interventions   Interventions Quick Adds Self-Care/Home Management   Self-Care/Home Management   Self-Care/Home Mgmt/ADL, Compensatory,  Meal Prep Minutes (22407) 33   Symptoms Noted During/After Treatment (Meal Preparation/Planning Training) increased pain;fatigue   Treatment Detail/Skilled Intervention Pt greeted supine in bed upon arrival and agreeable to therapy session with encouragmeent. Spouse present throughout session. Pt with baseline memory deficits, unable to provide sufficient home living situaiton information. Pt transferred to EOB with education on log rolling technique, required min A for bed mobility to follow spinal precuations and mod A to trasnfer to EOB. Pt tolerated sitting EOB self supported with CGA, pt endorsed increased pain at this time. Max A to don brace and to don socks on BLEs. Pt then trasnferred to STS with use of SS due to knee buckling and increased weakness. Pt required increased emotional support and therapuetic use of self, pt tearful due to pain. Pt able to trasnfer to STS with Mod Ax2 and use of SS. Pt then transported to bathroom with use of SS. Pt educated on toilet tx, able to complete stand from paddles with Min A and sit on SH toilet with CGA and cueing for controlled descent. Max A for Lb dressing to doff/don brief before STS from toilet with encouragement and Min A. Pt returned to EOB standing from paddles with CGA and returned to sitting with CGA then min A at LB to return to supine. Pt left in bed at end of session with call light within reach and appearing more alert. Bed alarm on.   OT Discharge Planning   OT Plan progress toileting, LB dressing with AE?, g/h cares EOB   OT Discharge Recommendation (DC Rec) Transitional Care Facility   OT Rationale for DC Rec Pt currently significantly below baseline with ADLs. Pt would benefit from TCU to increase strength and activity tolerance for ADLs prior to safely returning home. Will continue to follow for IP OT to progress and address current deficits in ADLs.   OT Brief overview of current status Mod Ax2 and SS to toilet   Total Session Time   Timed Code  Treatment Minutes 30   Total Session Time (sum of timed and untimed services) 37

## 2024-05-02 NOTE — CONSULTS
Care Management Initial Consult    General Information  Assessment completed with: Patient, Spouse or significant other, VM-chart review,    Type of CM/SW Visit: Offer D/C Planning    Primary Care Provider verified and updated as needed: Yes   Readmission within the last 30 days: no previous admission in last 30 days      Reason for Consult: discharge planning  Advance Care Planning:            Communication Assessment  Patient's communication style: spoken language (English or Bilingual)    Hearing Difficulty or Deaf: no   Wear Glasses or Blind: yes    Cognitive  Cognitive/Neuro/Behavioral: .WDL except, orientation  Level of Consciousness: confused  Arousal Level: opens eyes spontaneously  Orientation: place, time, situation  Mood/Behavior: restless  Best Language: 0 - No aphasia  Speech: spontaneous    Living Environment:   People in home: spouse     Current living Arrangements: house      Able to return to prior arrangements:  (to be determined)       Family/Social Support:  Care provided by: spouse/significant other, self  Provides care for: no one                Description of Support System:           Current Resources:   Patient receiving home care services: No     Community Resources:    Equipment currently used at home: none  Supplies currently used at home:      Employment/Financial:  Employment Status:          Financial Concerns:             Does the patient's insurance plan have a 3 day qualifying hospital stay waiver?  No    Lifestyle & Psychosocial Needs:  Social Determinants of Health     Food Insecurity: Not on file   Depression: Not on file   Housing Stability: Not on file   Tobacco Use: Medium Risk (4/30/2024)    Patient History     Smoking Tobacco Use: Former     Smokeless Tobacco Use: Never     Passive Exposure: Not on file   Financial Resource Strain: Not on file   Alcohol Use: Not on file   Transportation Needs: Not on file   Physical Activity: Not on file   Interpersonal Safety: Not on file    Stress: Not on file   Social Connections: Unknown (5/2/2023)    [facility]    Social Connections     Frequency of Communication with Friends and Family: Not on file   Health Literacy: Not on file       Functional Status:  Prior to admission patient needed assistance:   Dependent ADLs:: Independent  Dependent IADLs:: Cooking, Transportation (spouse assists with other IADLs since back injury)    Additional Information:  Sw consulted for discharge planning. SW met with pt and spouse and introduced self and discussed discharge recommendations currently.  Pt would like to go home, however pt is being recommended TCU at this time. Pt has a great deal of pain.  SW provided a Medicare.gov printout of TCU's in their living area.  AMANDA will follow up with them tomorrow for planning.    Pt lives with her  in a house in [place], [place]. Pt does not use an assistive walking device. Pt is independent with ADLs. Pt was independent with IADLs, prior to back injury. Spouse does the cooking and transportation. Spouse does more of the house cleaning.     KAVITA Alcantar, [facility]  Inpatient Care Coordination  [facility]  [phone]

## 2024-05-02 NOTE — PLAN OF CARE
"  Problem: Adult Inpatient Plan of Care  Goal: Plan of Care Review  Description: The Plan of Care Review/Shift note should be completed every shift.  The Outcome Evaluation is a brief statement about your assessment that the patient is improving, declining, or no change.  This information will be displayed automatically on your shift  note.  Outcome: Progressing  Flowsheets (Taken 5/2/2024 7494)  Outcome Evaluation: Improved orientation. Forgetful. Avoiding narcotics d/t risk for delirium. PT/OT recommending TCU. [facility] and Dr. Lozano's team notified of patient spending the night. Patient completely disoriented after nap. Took time to reorient.  Plan of Care Reviewed With:   patient   spouse  Overall Patient Progress: improving  Goal: Patient-Specific Goal (Individualized)  Description: You can add care plan individualizations to a care plan. Examples of Individualization might be:  \"Parent requests to be called daily at 9am for status\", \"I have a hard time hearing out of my right ear\", or \"Do not touch me to wake me up as it startles  me\".  Outcome: Progressing  Goal: Absence of Hospital-Acquired Illness or Injury  Outcome: Progressing  Intervention: Identify and Manage Fall Risk  Recent Flowsheet Documentation  Taken 5/2/2024 0927 by Lebron Castro RN  Safety Promotion/Fall Prevention:   safety round/check completed   activity supervised   assistive device/personal items within reach   increased rounding and observation   nonskid shoes/slippers when out of bed   patient and family education   room near nurse's station  Intervention: Prevent and Manage VTE (Venous Thromboembolism) Risk  Recent Flowsheet Documentation  Taken 5/2/2024 0927 by Lebron Castro RN  VTE Prevention/Management: SCDs (sequential compression devices) off  Goal: Optimal Comfort and Wellbeing  Outcome: Progressing  Intervention: Monitor Pain and Promote Comfort  Recent Flowsheet Documentation  Taken 5/2/2024 0397 by Matthew" JOAN Diana  Pain Management Interventions: medication (see MAR)  Taken 5/2/2024 0927 by Lebron Castro RN  Pain Management Interventions: medication (see MAR)  Goal: Readiness for Transition of Care  Outcome: Progressing     Problem: Spinal Surgery  Goal: Optimal Coping with Surgery  Outcome: Progressing  Goal: Absence of Bleeding  Outcome: Progressing  Goal: Effective Bowel Elimination  Outcome: Progressing  Goal: Fluid and Electrolyte Balance  Outcome: Progressing  Goal: Optimal Functional Ability  Outcome: Progressing  Goal: Absence of Infection Signs and Symptoms  Outcome: Progressing  Goal: Optimal Neurologic Function  Outcome: Progressing  Goal: Anesthesia/Sedation Recovery  Outcome: Progressing  Intervention: Optimize Anesthesia Recovery  Recent Flowsheet Documentation  Taken 5/2/2024 0927 by Lebron Castro RN  Safety Promotion/Fall Prevention:   safety round/check completed   activity supervised   assistive device/personal items within reach   increased rounding and observation   nonskid shoes/slippers when out of bed   patient and family education   room near nurse's station  Goal: Optimal Pain Control and Function  Outcome: Progressing  Intervention: Prevent or Manage Pain  Recent Flowsheet Documentation  Taken 5/2/2024 1459 by Lebron Castro RN  Pain Management Interventions: medication (see MAR)  Taken 5/2/2024 0927 by Lebron Castro RN  Pain Management Interventions: medication (see MAR)  Goal: Nausea and Vomiting Relief  Outcome: Progressing  Goal: Effective Urinary Elimination  Outcome: Progressing  Goal: Effective Oxygenation and Ventilation  Outcome: Progressing   Goal Outcome Evaluation:      Plan of Care Reviewed With: patient, spouse    Overall Patient Progress: improvingOverall Patient Progress: improving    Outcome Evaluation: Improved orientation. Forgetful. Avoiding narcotics. PT/OT recommending TCU or possibly home cares.

## 2024-05-02 NOTE — PROGRESS NOTES
"   05/02/24 0840   Appointment Info   Signing Clinician's Name / Credentials (PT) Cynthia Castanon, PT   Living Environment   People in Home spouse   Current Living Arrangements house  (1.5 story)   Home Accessibility stairs to enter home;stairs within home   Number of Stairs, Main Entrance 3   Stair Railings, Main Entrance other (see comments)  (makeshift rail)   Number of Stairs, Within Home, Primary eight  (multiple stairs)   Stair Railings, Within Home, Primary railings on both sides of stairs   Transportation Anticipated family or friend will provide   Self-Care   Usual Activity Tolerance moderate   Current Activity Tolerance poor   Equipment Currently Used at Home none   Fall history within last six months no  (??patient has dementia)   Activity/Exercise/Self-Care Comment normally ambulatory per patient   General Information   Onset of Illness/Injury or Date of Surgery 05/01/24   Referring Physician Cassandra Robison PA-C   Patient/Family Therapy Goals Statement (PT) return home   Pertinent History of Current Problem (include personal factors and/or comorbidities that impact the POC) per chart:Patient seen POD #1 s/p  \"LUMBAR TWO TO LUMBAR FIVE OBLIQUE LATERAL LUMBAR INTERBODY FUSION\"; dementia history per spouse/nursing   Existing Precautions/Restrictions fall;spinal;lifting;brace worn when out of bed   General Observations patient in bathroom with nursing A x 2 upon therapist arrival   Cognition   Orientation Status (Cognition) person;time  (knew month and year; \"[facility]\" for name of hospital)   Follows Commands (Cognition) 50-74% accuracy   Safety Deficit (Cognition) ability to follow commands;impulsivity;insight into deficits/self-awareness;safety precautions awareness;safety precautions follow-through/compliance   Cognitive Status Comments dementia per spouse/nursing   Pain Assessment   Patient Currently in Pain Yes, see Vital Sign flowsheet  (doesn't rate)   Integumentary/Edema   Integumentary/Edema Comments " posterior spinal incision   Range of Motion (ROM)   ROM Comment trunk limited by spinal precautions   Strength (Manual Muscle Testing)   Strength Comments weakness in legs; buckling during gait   Bed Mobility   Comment, (Bed Mobility) NT; wanting to go to chair after bathroom use   Transfers   Comment, (Transfers) sit>stand with mod A x 2; use of walker for support   Gait/Stairs (Locomotion)   Distance in Feet (Gait) 10   Comment, (Gait/Stairs) mod/max A x 2 with walker; legs buckling   Balance   Balance Comments impaired; current need for walker and assist   Sensory Examination   Sensory Perception Comments denies numbness and tingling   Clinical Impression   Criteria for Skilled Therapeutic Intervention Yes, treatment indicated   PT Diagnosis (PT) impaired functional mobility   Influenced by the following impairments spinal precautions; lifting restrictions; weakness; impaired cognition; decreased safety awareness; impaired judgement   Functional limitations due to impairments impaired independence with mobility and cares secondary to above deficits   Clinical Presentation (PT Evaluation Complexity) stable   Clinical Presentation Rationale clinical judgement; level of assist   Clinical Decision Making (Complexity) low complexity   Planned Therapy Interventions (PT) bed mobility training;balance training;gait training;patient/family education;stair training;transfer training;progressive activity/exercise   Risk & Benefits of therapy have been explained evaluation/treatment results reviewed;care plan/treatment goals reviewed;risks/benefits reviewed;current/potential barriers reviewed;participants voiced agreement with care plan;participants included;patient;spouse/significant other   PT Total Evaluation Time   PT Eval, Low Complexity Minutes (94204) 13   Physical Therapy Goals   PT Frequency 2x/day   PT Predicted Duration/Target Date for Goal Attainment 05/04/24   PT Goals Bed Mobility;Transfers;Gait;Stairs   PT  Discharge Planning   PT Discharge Recommendation (DC Rec) Transitional Care Facility;home with assist;home with home care physical therapy   PT Rationale for DC Rec Patient significantly below reported baseline level of function; currently requiring A x 2 for limited gait; buckling of knees during walking; high falls risk; unsafe to return home in current condition; unable to complete stairs safely; May need TCU; if patient becomes min  A x 1 with all mobility and cares, could return home with assist of spouse and would benefit from Home PT   PT Brief overview of current status A x 2 with walker for a few steps; recommend pivot to commode with nursing A x 2 or use of Casandra Steady if going into bathroom  or more than a few steps   PT Equipment Needed at Discharge walker, rolling;wheelchair

## 2024-05-02 NOTE — PLAN OF CARE
"Goal Outcome Evaluation:      Plan of Care Reviewed With: patient          Outcome Evaluation: on 2.5 L Oxygen. A&OX2.tolarated food. IV infusing, P[place] in.      Problem: Adult Inpatient Plan of Care  Goal: Plan of Care Review  Description: The Plan of Care Review/Shift note should be completed every shift.  The Outcome Evaluation is a brief statement about your assessment that the patient is improving, declining, or no change.  This information will be displayed automatically on your shift  note.  Outcome: Progressing  Flowsheets (Taken 5/1/2024 2305)  Outcome Evaluation: on 2.5 L Oxygen. A&OX2.tolarated food. IV infusing, P[place] in.  Plan of Care Reviewed With: patient  Goal: Patient-Specific Goal (Individualized)  Description: You can add care plan individualizations to a care plan. Examples of Individualization might be:  \"Parent requests to be called daily at 9am for status\", \"I have a hard time hearing out of my right ear\", or \"Do not touch me to wake me up as it startles  me\".  Outcome: Progressing  Goal: Absence of Hospital-Acquired Illness or Injury  Outcome: Progressing  Intervention: Identify and Manage Fall Risk  Recent Flowsheet Documentation  Taken 5/1/2024 1700 by Ivelisse Vaz RN  Safety Promotion/Fall Prevention:   activity supervised   increase visualization of patient   room near nurse's station   safety round/check completed   supervised activity  Intervention: Prevent and Manage VTE (Venous Thromboembolism) Risk  Recent Flowsheet Documentation  Taken 5/1/2024 1700 by Ivelisse Vaz RN  VTE Prevention/Management: SCDs (sequential compression devices) on  Intervention: Prevent Infection  Recent Flowsheet Documentation  Taken 5/1/2024 1700 by Ivelisse Vaz RN  Infection Prevention: single patient room provided  Goal: Optimal Comfort and Wellbeing  Outcome: Progressing  Intervention: Monitor Pain and Promote Comfort  Recent Flowsheet Documentation  Taken 5/1/2024 1700 by Ivelisse Vaz RN  Pain " Management Interventions:   cold applied   medication (see MAR)   emotional support   quiet environment facilitated   relaxation techniques promoted   repositioned   rest  Goal: Readiness for Transition of Care  Outcome: Progressing     Problem: Spinal Surgery  Goal: Optimal Coping with Surgery  Outcome: Progressing  Goal: Absence of Bleeding  Outcome: Progressing  Intervention: Monitor and Manage Bleeding  Recent Flowsheet Documentation  Taken 5/1/2024 1700 by Ivelisse Vaz RN  Bleeding Management: dressing monitored  Goal: Effective Bowel Elimination  Outcome: Progressing  Goal: Fluid and Electrolyte Balance  Outcome: Progressing  Goal: Optimal Functional Ability  Outcome: Progressing  Intervention: Optimize Functional Status  Recent Flowsheet Documentation  Taken 5/1/2024 1700 by Ivelisse Vaz RN  Activity Management: activity adjusted per tolerance  Goal: Absence of Infection Signs and Symptoms  Outcome: Progressing  Intervention: Prevent or Manage Infection  Recent Flowsheet Documentation  Taken 5/1/2024 1700 by Ivelisse Vaz RN  Infection Prevention: single patient room provided  Goal: Optimal Neurologic Function  Outcome: Progressing  Goal: Anesthesia/Sedation Recovery  Outcome: Progressing  Intervention: Optimize Anesthesia Recovery  Recent Flowsheet Documentation  Taken 5/1/2024 1700 by Ivelisse Vaz RN  Safety Promotion/Fall Prevention:   activity supervised   increase visualization of patient   room near nurse's station   safety round/check completed   supervised activity  Goal: Optimal Pain Control and Function  Outcome: Progressing  Intervention: Prevent or Manage Pain  Recent Flowsheet Documentation  Taken 5/1/2024 1700 by Ivelisse Vaz RN  Pain Management Interventions:   cold applied   medication (see MAR)   emotional support   quiet environment facilitated   relaxation techniques promoted   repositioned   rest  Goal: Nausea and Vomiting Relief  Outcome: Progressing  Goal: Effective Urinary  Elimination  Outcome: Progressing  Goal: Effective Oxygenation and Ventilation  Outcome: Progressing   Pt has intermittent confusion.  She is on CAPNO monitoring. Continues on 2.5 L Oxygen. IV  ml infusing. No c/o N/V. Dressing CDI, no drainage. Piedra drains boyd kym color urine. Dangled legs,for very short time. Pt c/o pain 8-9/10. IV dilaudid 0.2 mg was given X2 for this shift. PRN Toradol IV  and PO oxycodone was given.continues cefazolin IV .

## 2024-05-03 ENCOUNTER — APPOINTMENT (OUTPATIENT)
Dept: OCCUPATIONAL THERAPY | Facility: CLINIC | Age: 69
DRG: 454 | End: 2024-05-03
Attending: ORTHOPAEDIC SURGERY
Payer: MEDICARE

## 2024-05-03 ENCOUNTER — APPOINTMENT (OUTPATIENT)
Dept: PHYSICAL THERAPY | Facility: CLINIC | Age: 69
DRG: 454 | End: 2024-05-03
Attending: ORTHOPAEDIC SURGERY
Payer: MEDICARE

## 2024-05-03 LAB
ANION GAP SERPL CALCULATED.3IONS-SCNC: 9 MMOL/L (ref 7–15)
BASOPHILS # BLD AUTO: 0 10E3/UL (ref 0–0.2)
BASOPHILS NFR BLD AUTO: 0 %
BUN SERPL-MCNC: 17.2 MG/DL (ref 8–23)
CALCIUM SERPL-MCNC: 8.4 MG/DL (ref 8.8–10.2)
CHLORIDE SERPL-SCNC: 106 MMOL/L (ref 98–107)
CREAT SERPL-MCNC: 0.76 MG/DL (ref 0.51–0.95)
DEPRECATED HCO3 PLAS-SCNC: 24 MMOL/L (ref 22–29)
EGFRCR SERPLBLD CKD-EPI 2021: 84 ML/MIN/1.73M2
EOSINOPHIL # BLD AUTO: 0.1 10E3/UL (ref 0–0.7)
EOSINOPHIL NFR BLD AUTO: 1 %
ERYTHROCYTE [DISTWIDTH] IN BLOOD BY AUTOMATED COUNT: 13.7 % (ref 10–15)
GLUCOSE SERPL-MCNC: 107 MG/DL (ref 70–99)
HCT VFR BLD AUTO: 29.4 % (ref 35–47)
HGB BLD-MCNC: 9.6 G/DL (ref 11.7–15.7)
IMM GRANULOCYTES # BLD: 0.1 10E3/UL
IMM GRANULOCYTES NFR BLD: 1 %
LYMPHOCYTES # BLD AUTO: 1.9 10E3/UL (ref 0.8–5.3)
LYMPHOCYTES NFR BLD AUTO: 21 %
MCH RBC QN AUTO: 29.4 PG (ref 26.5–33)
MCHC RBC AUTO-ENTMCNC: 32.7 G/DL (ref 31.5–36.5)
MCV RBC AUTO: 90 FL (ref 78–100)
MONOCYTES # BLD AUTO: 0.7 10E3/UL (ref 0–1.3)
MONOCYTES NFR BLD AUTO: 8 %
NEUTROPHILS # BLD AUTO: 6 10E3/UL (ref 1.6–8.3)
NEUTROPHILS NFR BLD AUTO: 69 %
NRBC # BLD AUTO: 0 10E3/UL
NRBC BLD AUTO-RTO: 0 /100
PLATELET # BLD AUTO: 184 10E3/UL (ref 150–450)
POTASSIUM SERPL-SCNC: 3.7 MMOL/L (ref 3.4–5.3)
RBC # BLD AUTO: 3.26 10E6/UL (ref 3.8–5.2)
SODIUM SERPL-SCNC: 139 MMOL/L (ref 135–145)
WBC # BLD AUTO: 8.8 10E3/UL (ref 4–11)

## 2024-05-03 PROCEDURE — 97116 GAIT TRAINING THERAPY: CPT | Mod: GP | Performed by: PHYSICAL THERAPIST

## 2024-05-03 PROCEDURE — 80048 BASIC METABOLIC PNL TOTAL CA: CPT

## 2024-05-03 PROCEDURE — 97530 THERAPEUTIC ACTIVITIES: CPT | Mod: GP | Performed by: PHYSICAL THERAPIST

## 2024-05-03 PROCEDURE — 99232 SBSQ HOSP IP/OBS MODERATE 35: CPT | Performed by: INTERNAL MEDICINE

## 2024-05-03 PROCEDURE — 250N000013 HC RX MED GY IP 250 OP 250 PS 637: Performed by: INTERNAL MEDICINE

## 2024-05-03 PROCEDURE — 97535 SELF CARE MNGMENT TRAINING: CPT | Mod: GO

## 2024-05-03 PROCEDURE — 250N000013 HC RX MED GY IP 250 OP 250 PS 637

## 2024-05-03 PROCEDURE — 250N000011 HC RX IP 250 OP 636

## 2024-05-03 PROCEDURE — 36415 COLL VENOUS BLD VENIPUNCTURE: CPT

## 2024-05-03 PROCEDURE — 120N000001 HC R&B MED SURG/OB

## 2024-05-03 PROCEDURE — 85025 COMPLETE CBC W/AUTO DIFF WBC: CPT

## 2024-05-03 RX ORDER — HYDROMORPHONE HYDROCHLORIDE 2 MG/1
2 TABLET ORAL EVERY 6 HOURS PRN
Qty: 10 TABLET | Refills: 0 | Status: SHIPPED | OUTPATIENT
Start: 2024-05-03 | End: 2024-05-06

## 2024-05-03 RX ORDER — HYDROMORPHONE HYDROCHLORIDE 2 MG/1
2 TABLET ORAL EVERY 4 HOURS PRN
Status: DISCONTINUED | OUTPATIENT
Start: 2024-05-03 | End: 2024-05-06 | Stop reason: HOSPADM

## 2024-05-03 RX ADMIN — SENNOSIDES AND DOCUSATE SODIUM 1 TABLET: 8.6; 5 TABLET ORAL at 20:39

## 2024-05-03 RX ADMIN — SERTRALINE HYDROCHLORIDE 100 MG: 100 TABLET ORAL at 08:35

## 2024-05-03 RX ADMIN — DONEPEZIL HYDROCHLORIDE 5 MG: 5 TABLET ORAL at 22:05

## 2024-05-03 RX ADMIN — KETOROLAC TROMETHAMINE 15 MG: 15 INJECTION, SOLUTION INTRAMUSCULAR; INTRAVENOUS at 05:57

## 2024-05-03 RX ADMIN — HYDROMORPHONE HYDROCHLORIDE 0.2 MG: 0.2 INJECTION, SOLUTION INTRAMUSCULAR; INTRAVENOUS; SUBCUTANEOUS at 01:48

## 2024-05-03 RX ADMIN — HYDROMORPHONE HYDROCHLORIDE 2 MG: 2 TABLET ORAL at 17:41

## 2024-05-03 RX ADMIN — METHOCARBAMOL 750 MG: 750 TABLET ORAL at 01:49

## 2024-05-03 RX ADMIN — POLYETHYLENE GLYCOL 3350 17 G: 17 POWDER, FOR SOLUTION ORAL at 08:35

## 2024-05-03 RX ADMIN — METHOCARBAMOL 750 MG: 750 TABLET ORAL at 14:03

## 2024-05-03 RX ADMIN — RALOXIFENE HYDROCHLORIDE 60 MG: 60 TABLET, FILM COATED ORAL at 08:35

## 2024-05-03 RX ADMIN — HYDROMORPHONE HYDROCHLORIDE 0.2 MG: 0.2 INJECTION, SOLUTION INTRAMUSCULAR; INTRAVENOUS; SUBCUTANEOUS at 15:59

## 2024-05-03 RX ADMIN — ACETAMINOPHEN 1000 MG: 500 TABLET, FILM COATED ORAL at 01:49

## 2024-05-03 RX ADMIN — HYDROXYZINE HYDROCHLORIDE 10 MG: 10 TABLET ORAL at 17:41

## 2024-05-03 RX ADMIN — HYDROMORPHONE HYDROCHLORIDE 2 MG: 2 TABLET ORAL at 11:19

## 2024-05-03 RX ADMIN — ATORVASTATIN CALCIUM 20 MG: 20 TABLET, FILM COATED ORAL at 22:05

## 2024-05-03 RX ADMIN — METHOCARBAMOL 750 MG: 750 TABLET ORAL at 20:39

## 2024-05-03 RX ADMIN — HYDROXYZINE HYDROCHLORIDE 10 MG: 10 TABLET ORAL at 05:57

## 2024-05-03 RX ADMIN — ACETAMINOPHEN 1000 MG: 500 TABLET, FILM COATED ORAL at 14:53

## 2024-05-03 RX ADMIN — CETIRIZINE HYDROCHLORIDE 10 MG: 10 TABLET, FILM COATED ORAL at 08:35

## 2024-05-03 RX ADMIN — HYDROXYZINE HYDROCHLORIDE 10 MG: 10 TABLET ORAL at 11:20

## 2024-05-03 RX ADMIN — NADOLOL 40 MG: 20 TABLET ORAL at 08:35

## 2024-05-03 RX ADMIN — AMLODIPINE BESYLATE 5 MG: 5 TABLET ORAL at 08:35

## 2024-05-03 RX ADMIN — HYDROMORPHONE HYDROCHLORIDE 2 MG: 2 TABLET ORAL at 23:17

## 2024-05-03 RX ADMIN — METHOCARBAMOL 750 MG: 750 TABLET ORAL at 08:35

## 2024-05-03 RX ADMIN — HYDROMORPHONE HYDROCHLORIDE 0.2 MG: 0.2 INJECTION, SOLUTION INTRAMUSCULAR; INTRAVENOUS; SUBCUTANEOUS at 06:41

## 2024-05-03 RX ADMIN — PANTOPRAZOLE SODIUM 40 MG: 40 TABLET, DELAYED RELEASE ORAL at 06:41

## 2024-05-03 RX ADMIN — SENNOSIDES AND DOCUSATE SODIUM 1 TABLET: 8.6; 5 TABLET ORAL at 08:35

## 2024-05-03 ASSESSMENT — ACTIVITIES OF DAILY LIVING (ADL)
ADLS_ACUITY_SCORE: 28
ADLS_ACUITY_SCORE: 30
ADLS_ACUITY_SCORE: 25
ADLS_ACUITY_SCORE: 28
ADLS_ACUITY_SCORE: 30
ADLS_ACUITY_SCORE: 25
ADLS_ACUITY_SCORE: 28
ADLS_ACUITY_SCORE: 25
ADLS_ACUITY_SCORE: 25
ADLS_ACUITY_SCORE: 28
ADLS_ACUITY_SCORE: 30
ADLS_ACUITY_SCORE: 28
ADLS_ACUITY_SCORE: 28
ADLS_ACUITY_SCORE: 30
ADLS_ACUITY_SCORE: 30
ADLS_ACUITY_SCORE: 28
ADLS_ACUITY_SCORE: 30
ADLS_ACUITY_SCORE: 30
ADLS_ACUITY_SCORE: 28
ADLS_ACUITY_SCORE: 25

## 2024-05-03 NOTE — PLAN OF CARE
"Goal Outcome Evaluation:      Plan of Care Reviewed With: patient, spouse    Overall Patient Progress: improvingOverall Patient Progress: improving    Outcome Evaluation: Pt is forgetful, agitated at bedtime, prn Seroquel given x1. Pt is up with assist of 1. Reports pain 9-10/10, prn Tylenol, Atarax, Robaxin, IV Dilaudid given.    Patient vital signs are at baseline: No,  Reason:  on 2L O2  Patient able to ambulate as they were prior to admission or with assist devices provided by therapies during their stay: yes  Patient MUST void prior to discharge:  Yes  Patient able to tolerate oral intake:  Yes  Pain has adequate pain control using Oral analgesics:  No,  Reason:  IV DiLaudid given between oral avail meds  Does patient have an identified :  Yes  Has goal D/C date and time been discussed with patient:  Yes, TCU      Problem: Adult Inpatient Plan of Care  Goal: Plan of Care Review  Description: The Plan of Care Review/Shift note should be completed every shift.  The Outcome Evaluation is a brief statement about your assessment that the patient is improving, declining, or no change.  This information will be displayed automatically on your shift  note.  Outcome: Progressing  Flowsheets (Taken 5/3/2024 0707)  Outcome Evaluation: Pt is forgetful, agitated at bedtime, prn Seroquel given x1. Pt is up with assist of 1. Reports pain 9-10/10, prn Tylenol, Atarax, Robaxin, IV Dilaudid given.  Plan of Care Reviewed With:   patient   spouse  Overall Patient Progress: improving  Goal: Patient-Specific Goal (Individualized)  Description: You can add care plan individualizations to a care plan. Examples of Individualization might be:  \"Parent requests to be called daily at 9am for status\", \"I have a hard time hearing out of my right ear\", or \"Do not touch me to wake me up as it startles  me\".  Outcome: Progressing  Goal: Absence of Hospital-Acquired Illness or Injury  Outcome: Progressing  Intervention: Identify and Manage " Fall Risk  Recent Flowsheet Documentation  Taken 5/3/2024 0451 by Gwendolyn Mayes RN  Safety Promotion/Fall Prevention:   safety round/check completed   activity supervised   assistive device/personal items within reach   increased rounding and observation   nonskid shoes/slippers when out of bed   patient and family education   room near nurse's station  Intervention: Prevent Skin Injury  Recent Flowsheet Documentation  Taken 5/2/2024 2008 by Gwendolyn Mayes RN  Body Position: position changed independently  Intervention: Prevent and Manage VTE (Venous Thromboembolism) Risk  Recent Flowsheet Documentation  Taken 5/3/2024 0451 by Gwendolyn Mayes RN  VTE Prevention/Management: SCDs (sequential compression devices) off  Goal: Optimal Comfort and Wellbeing  Outcome: Progressing  Intervention: Monitor Pain and Promote Comfort  Recent Flowsheet Documentation  Taken 5/3/2024 0656 by Gwendolyn Mayes RN  Pain Management Interventions: medication (see MAR)  Taken 5/3/2024 0234 by Gwendolyn Mayes RN  Pain Management Interventions: medication (see MAR)  Taken 5/3/2024 0203 by Gwendolyn Mayes RN  Pain Management Interventions: medication (see MAR)  Goal: Readiness for Transition of Care  Outcome: Progressing     Problem: Spinal Surgery  Goal: Optimal Coping with Surgery  Outcome: Progressing  Goal: Absence of Bleeding  Outcome: Progressing  Goal: Effective Bowel Elimination  Outcome: Progressing  Goal: Fluid and Electrolyte Balance  Outcome: Progressing  Goal: Optimal Functional Ability  Outcome: Progressing  Intervention: Optimize Functional Status  Recent Flowsheet Documentation  Taken 5/3/2024 0451 by Gwendolyn Mayes RN  Activity Management: up in chair  Taken 5/2/2024 2008 by Gwendolyn Mayes RN  Activity Management: up to bedside commode  Goal: Absence of Infection Signs and Symptoms  Outcome: Progressing  Goal: Optimal Neurologic Function  Outcome: Progressing  Intervention: Optimize Neurologic Function  Recent Flowsheet  Documentation  Taken 5/2/2024 2008 by Gwendolyn Mayes RN  Body Position: position changed independently  Goal: Anesthesia/Sedation Recovery  Outcome: Progressing  Intervention: Optimize Anesthesia Recovery  Recent Flowsheet Documentation  Taken 5/3/2024 0451 by Gwendolyn Mayes RN  Safety Promotion/Fall Prevention:   safety round/check completed   activity supervised   assistive device/personal items within reach   increased rounding and observation   nonskid shoes/slippers when out of bed   patient and family education   room near nurse's station  Goal: Optimal Pain Control and Function  Outcome: Progressing  Intervention: Prevent or Manage Pain  Recent Flowsheet Documentation  Taken 5/3/2024 0656 by Gwendolyn Mayes RN  Pain Management Interventions: medication (see MAR)  Taken 5/3/2024 0234 by Gwendolyn Mayes RN  Pain Management Interventions: medication (see MAR)  Taken 5/3/2024 0203 by Gwendolyn Mayes, RN  Pain Management Interventions: medication (see MAR)  Goal: Nausea and Vomiting Relief  Outcome: Progressing  Goal: Effective Urinary Elimination  Outcome: Progressing  Goal: Effective Oxygenation and Ventilation  Outcome: Progressing

## 2024-05-03 NOTE — PLAN OF CARE
"Goal Outcome Evaluation:      Plan of Care Reviewed With: patient, spouse    Overall Patient Progress: improving    Outcome Evaluation: Pain manged with PRN PO Dilaudid, robaxin, atarax.    Patient is A/O, intermittent confusion, Spouse at bedside. VSS, on RA. Pain managed  with PRN PO Dilaudid, robaxin, atarax, and Tylenol. Tolerating diet. Dressing CDI, cms intact. Voiding ok. Assist x 1 using w/gb. Up in a chair during the shift. PT/OT is following. Discharge plan TCU on Monday.    Problem: Adult Inpatient Plan of Care  Goal: Plan of Care Review  Description: The Plan of Care Review/Shift note should be completed every shift.  The Outcome Evaluation is a brief statement about your assessment that the patient is improving, declining, or no change.  This information will be displayed automatically on your shift  note.  Outcome: Progressing  Flowsheets (Taken 5/3/2024 1418)  Outcome Evaluation: Pain manged with PRN PO Dilaudid, robaxin, atarax.  Plan of Care Reviewed With:   patient   spouse  Overall Patient Progress: improving  Goal: Patient-Specific Goal (Individualized)  Description: You can add care plan individualizations to a care plan. Examples of Individualization might be:  \"Parent requests to be called daily at 9am for status\", \"I have a hard time hearing out of my right ear\", or \"Do not touch me to wake me up as it startles  me\".  Outcome: Progressing  Goal: Absence of Hospital-Acquired Illness or Injury  Outcome: Progressing  Intervention: Identify and Manage Fall Risk  Recent Flowsheet Documentation  Taken 5/3/2024 1300 by Carlee Kendrick, RN  Safety Promotion/Fall Prevention:   safety round/check completed   activity supervised   assistive device/personal items within reach   increased rounding and observation   nonskid shoes/slippers when out of bed   patient and family education   room near nurse's station   room door open  Intervention: Prevent Skin Injury  Recent Flowsheet Documentation  Taken " 5/3/2024 1300 by Carlee Kendrick RN  Body Position: position changed independently  Skin Protection: adhesive use limited  Intervention: Prevent and Manage VTE (Venous Thromboembolism) Risk  Recent Flowsheet Documentation  Taken 5/3/2024 1300 by Carlee Kendrick RN  VTE Prevention/Management: SCDs (sequential compression devices) on  Intervention: Prevent Infection  Recent Flowsheet Documentation  Taken 5/3/2024 1300 by Carlee Kendrick RN  Infection Prevention:   single patient room provided   hand hygiene promoted  Goal: Optimal Comfort and Wellbeing  Outcome: Progressing  Intervention: Monitor Pain and Promote Comfort  Recent Flowsheet Documentation  Taken 5/3/2024 1119 by Carlee Kendrick RN  Pain Management Interventions: medication (see MAR)  Goal: Readiness for Transition of Care  Outcome: Progressing     Problem: Spinal Surgery  Goal: Optimal Coping with Surgery  Outcome: Progressing  Goal: Absence of Bleeding  Outcome: Progressing  Intervention: Monitor and Manage Bleeding  Recent Flowsheet Documentation  Taken 5/3/2024 1300 by Carlee Kendrick RN  Bleeding Management: dressing monitored  Goal: Effective Bowel Elimination  Outcome: Progressing  Goal: Fluid and Electrolyte Balance  Outcome: Progressing  Goal: Optimal Functional Ability  Outcome: Progressing  Intervention: Optimize Functional Status  Recent Flowsheet Documentation  Taken 5/3/2024 1300 by Carlee Kendrick RN  Activity Management:   activity adjusted per tolerance   activity encouraged  Positioning/Transfer Devices:   pillows   in use  Goal: Absence of Infection Signs and Symptoms  Outcome: Progressing  Intervention: Prevent or Manage Infection  Recent Flowsheet Documentation  Taken 5/3/2024 1300 by Carlee Kendrick RN  Infection Prevention:   single patient room provided   hand hygiene promoted  Infection Management: aseptic technique maintained  Goal: Optimal Neurologic Function  Outcome: Progressing  Intervention: Optimize Neurologic  Function  Recent Flowsheet Documentation  Taken 5/3/2024 1300 by Carlee Kendrick RN  Body Position: position changed independently  Range of Motion: active ROM (range of motion) encouraged  Goal: Anesthesia/Sedation Recovery  Outcome: Progressing  Intervention: Optimize Anesthesia Recovery  Recent Flowsheet Documentation  Taken 5/3/2024 1300 by Carlee Kendrick RN  Safety Promotion/Fall Prevention:   safety round/check completed   activity supervised   assistive device/personal items within reach   increased rounding and observation   nonskid shoes/slippers when out of bed   patient and family education   room near nurse's station   room door open  Administration (IS): instruction provided, follow-up  Goal: Optimal Pain Control and Function  Outcome: Progressing  Intervention: Prevent or Manage Pain  Recent Flowsheet Documentation  Taken 5/3/2024 1119 by Carlee Kendrick RN  Pain Management Interventions: medication (see MAR)  Goal: Nausea and Vomiting Relief  Outcome: Progressing  Goal: Effective Urinary Elimination  Outcome: Progressing  Goal: Effective Oxygenation and Ventilation  Outcome: Progressing  Intervention: Optimize Oxygenation and Ventilation  Recent Flowsheet Documentation  Taken 5/3/2024 1300 by Carlee Kendrick RN  Head of Bed (HOB) Positioning: HOB at 20-30 degrees

## 2024-05-03 NOTE — PROGRESS NOTES
"DAILY PROGRESS NOTE    Suzette Calix is a 69 year old old female admitted on 5/1/2024  5:37 AM.    Subjective  Suzette is a 69-year-old female who presents today POD2 from undergoing an L2-L5 oblique lateral interbody fusion (OLLIF) performed by Dr. Lozano.   She reports persistent, significant pain, rating it as an 8/10, primarily in the low back with accompanying thigh \"tingling\", which does not extend below the knees.   Despite this, she remains hopeful about her imminent hospital discharge. Following a review of the social work and physical therapy notes, the possibility of transfer to a transitional care unit (TCU) was discussed. Suzette agrees with this contingency plan and expresses a preference for a facility closer to her home in [place], [place], mentioning [facility] as a potential option. I assured her that a  would discuss potential facilities with her later today.    In addition, Suzette is scheduled for further evaluations by physical and occupational therapy to determine if she is medically ready for discharge.  After consultation with the nursing staff, it was brought to my attention that Suzette experienced adverse reactions to oxycodone. Consequently, the nursing staff has been administering intravenous Dilaudid.  We have decided to prescribe oral Dilaudid at a dosage of 2 milligrams and to discontinue oxycodone, aiming for more effective pain management via oral administration.    Objective    AAOx3, Dorothea CRISTOBAL is out, Neuro Intact  Ambulating w/ assist    Hemoglobin   Date Value Ref Range Status   05/03/2024 9.6 (L) 11.7 - 15.7 g/dL Final   ]      Impression / Plan  Discharge pending PT/OT clearance  Possible TCU Discharge vs Home    Plan for today:    Patient doing well  Ambulate with help  PT OT, possibly clearance   Full diet  Today  Transition to PO meds today   Added PO Hydromorphone 2mg - Discontinued PO Oxycodone    Cassandra Haberer PAC        "

## 2024-05-03 NOTE — PROGRESS NOTES
Care Management Follow Up    Length of Stay (days): 2    Expected Discharge Date: 05/06/2024     Concerns to be Addressed: discharge planning     Patient plan of care discussed at interdisciplinary rounds: Yes    Anticipated Discharge Disposition: Transitional Care     Anticipated Discharge Services:    Anticipated Discharge DME:      Patient/family educated on Medicare website which has current facility and service quality ratings: yes  Education Provided on the Discharge Plan:    Patient/Family in Agreement with the Plan: yes    Referrals Placed by CM/SW: Post Acute Facilities  Private pay costs discussed: Not applicable    Additional Information:  Sw met with pt and  to obtain TCU requests.    [facility]  [phone]-left  requesting fax for referral      [facility], faxed referral and spoke with admissions, they only accept internal referrals and only have 14 beds, no availability.    [facility]  [phone]   F: [phone]  LVM re: referral    [facility]  [phone]  F: [phone]  Spoke with admissions, reviewing referral now.    [facility]  [phone] requesting fax for referral    [facility]  [phone]-lvm on DONs phone for fax number to send referral    Update: [facility] accepted pt, they're completing the [place] PAS, once cleared they can accept pt. SW needs to call tomorrow, [phone].  Spouse updated.    KAVITA Alcantar, [facility]  Inpatient Care Coordination  [facility]  [phone]

## 2024-05-03 NOTE — PROGRESS NOTES
[facility]    Medicine Progress Note - Hospitalist Service    Date of Admission:  5/1/2024    Assessment & Plan    Summary of Stay: Suzette Calix is a 69 year old female admitted on 5/1/2024.She underwent spinal surgery as below      PROCEDURE PERFORMED:  1) L2 to L5 Oblique Lateral Lumbar Interbody fusion with discectomy, preparation of the endplate and placement of a titanium bullet cage packed with tri-calcium phosphate soaked in bone marrow anterior to the transverse process in modified prone position, with intraoperative biplanar fluoroscopic imaging and electrophysiological monitoring.  2) L2 to  L5  Posterior minimally invasive pedicle screw placement and posterolateral instrumentation and fusion with system with intraoperative biplanar fluoroscopic imaging and electrophysiological monitoring.  3) Transpedicular Bone marrow aspiration through separate incision     Hospitalist service was consulted for postoperative medical management.        Problem List with Assessment and Plan:     #1.  Chronic back pain secondary to L2-5 degenerative scoliosis and stenosis and claudication  -- Postoperative following above procedure  -- His confusion is improving postoperatively.  --- Will continue postoperative care including close monitoring of her mental status, vitals and risk of delirium  -Spine service modified patient's pain regimen with trial of switching to oral hydromorphone and discontinued oxycodone     #2.  Hypertension  -- Chronic hypertension on home amlodipine 10 mg once a day  -- Resumed  -Stable hemodynamics     #3.  GERD on omeprazole, continued     #4.  Hyperlipidemia on atorvastatin, continued      #5.  Anxiety, depression-Home medications resumed     #6.  Suspected dementia: Continue home Aricept  --Patient has diagnosis of dementia and appears to have some confusion intermittently.  She is at risk of increased encephalopathy needs to be closely monitored.  As needed Seroquel for  "agitation.     #7.  Acute postoperative hypoxia secondary to atelectasis  -- Incentive spirometry, supplemental oxygen as needed     #8.  Generalized weakness and postoperative status  -- PT and OT service consulted.  Patient may benefit from rehabilitation.   consulted  -Plans with intention going to a TCU.  Working with      #9.  Acute postoperative blood loss anemia  -- Preop hemoglobin was 13.4.  Hemoglobin dropped to 9.8.  Will continue to monitor hemoglobin and transfuse as needed     -Most recent hemoglobin today stable at 9.6.  No indication for acute blood transfusion          Diet: Advance Diet as Tolerated: Regular Diet Adult  Diet    DVT Prophylaxis: Pneumatic Compression Devices and Ambulate every shift  Piedra Catheter: Not present  Lines: None     Cardiac Monitoring: None  Code Status: Full Code      Clinically Significant Risk Factors                         # Overweight: Estimated body mass index is 26.25 kg/m  as calculated from the following:    Height as of this encounter: 1.613 m (5' 3.5\").    Weight as of this encounter: 68.3 kg (150 lb 9.2 oz)., PRESENT ON ADMISSION            Disposition Plan     Medically Ready for Discharge: Defer to primary spine service             Ranulfo Jones MD, MD  Hospitalist Service  [facility]  Securely message with [employer] (more info)  Text page via [employer] Paging/Directory   ______________________________________________________________________    Interval History   I assumed medicine consult service today  Continues to demonstrate stable hemodynamics currently metabolic support  Reportedly not getting optimize pain control and still requiring IV pain regimen.  No reports of any nausea or vomiting  Remain afebrile    Physical Exam   Vital Signs: Temp: 98.8  F (37.1  C) Temp src: Temporal BP: 121/45 Pulse: 81   Resp: 18 SpO2: 96 % O2 Device: None (Room air) Oxygen Delivery: 2 LPM  Weight: 150 lbs 9.19 " oz    HEENT; Atraumatic, normocephalic, pinkish conjuctiva, pupils bilateral reactive   Skin: warm and moist, no rashes  Lungs: equal chest expansion, clear to auscultation, no wheezes, no stridor, no crackles,   Heart: normal rate, normal rhythm, no rubs or gallops.   Abdomen: normal bowel sounds, no tenderness, no peritoneal signs, no guarding  Extremities: no deformities, no edema   Neuro; follow commands, alert and oriented x3, spontaneous speech, coherent, moves all extremities spontaneously  Psych; no hallucination, euthymic mood, not agitated      Medical Decision Making       35 MINUTES SPENT BY ME on the date of service doing chart review, history, exam, documentation & further activities per the note.  MANAGEMENT DISCUSSED with the following over the past 24 hours: Yes   NOTE(S)/MEDICAL RECORDS REVIEWED over the past 24 hours: Yes       Data     I have personally reviewed the following data over the past 24 hrs:    8.8  \   9.6 (L)   / 184     139 106 17.2 /  107 (H)   3.7 24 0.76 \       Imaging results reviewed over the past 24 hrs:   No results found for this or any previous visit (from the past 24 hour(s)).

## 2024-05-04 ENCOUNTER — APPOINTMENT (OUTPATIENT)
Dept: PHYSICAL THERAPY | Facility: CLINIC | Age: 69
DRG: 454 | End: 2024-05-04
Attending: ORTHOPAEDIC SURGERY
Payer: MEDICARE

## 2024-05-04 ENCOUNTER — APPOINTMENT (OUTPATIENT)
Dept: OCCUPATIONAL THERAPY | Facility: CLINIC | Age: 69
DRG: 454 | End: 2024-05-04
Attending: ORTHOPAEDIC SURGERY
Payer: MEDICARE

## 2024-05-04 LAB
ANION GAP SERPL CALCULATED.3IONS-SCNC: 9 MMOL/L (ref 7–15)
BASOPHILS # BLD AUTO: 0.1 10E3/UL (ref 0–0.2)
BASOPHILS NFR BLD AUTO: 1 %
BUN SERPL-MCNC: 14.2 MG/DL (ref 8–23)
CALCIUM SERPL-MCNC: 8.4 MG/DL (ref 8.8–10.2)
CHLORIDE SERPL-SCNC: 108 MMOL/L (ref 98–107)
CREAT SERPL-MCNC: 0.58 MG/DL (ref 0.51–0.95)
DEPRECATED HCO3 PLAS-SCNC: 25 MMOL/L (ref 22–29)
EGFRCR SERPLBLD CKD-EPI 2021: >90 ML/MIN/1.73M2
EOSINOPHIL # BLD AUTO: 0.2 10E3/UL (ref 0–0.7)
EOSINOPHIL NFR BLD AUTO: 2 %
ERYTHROCYTE [DISTWIDTH] IN BLOOD BY AUTOMATED COUNT: 13.9 % (ref 10–15)
GLUCOSE SERPL-MCNC: 125 MG/DL (ref 70–99)
HCT VFR BLD AUTO: 29.7 % (ref 35–47)
HGB BLD-MCNC: 10 G/DL (ref 11.7–15.7)
IMM GRANULOCYTES # BLD: 0.1 10E3/UL
IMM GRANULOCYTES NFR BLD: 1 %
LYMPHOCYTES # BLD AUTO: 2.3 10E3/UL (ref 0.8–5.3)
LYMPHOCYTES NFR BLD AUTO: 25 %
MCH RBC QN AUTO: 29.9 PG (ref 26.5–33)
MCHC RBC AUTO-ENTMCNC: 33.7 G/DL (ref 31.5–36.5)
MCV RBC AUTO: 89 FL (ref 78–100)
MONOCYTES # BLD AUTO: 0.7 10E3/UL (ref 0–1.3)
MONOCYTES NFR BLD AUTO: 7 %
NEUTROPHILS # BLD AUTO: 6.1 10E3/UL (ref 1.6–8.3)
NEUTROPHILS NFR BLD AUTO: 64 %
NRBC # BLD AUTO: 0 10E3/UL
NRBC BLD AUTO-RTO: 0 /100
PLATELET # BLD AUTO: 219 10E3/UL (ref 150–450)
POTASSIUM SERPL-SCNC: 3.6 MMOL/L (ref 3.4–5.3)
RBC # BLD AUTO: 3.35 10E6/UL (ref 3.8–5.2)
SODIUM SERPL-SCNC: 142 MMOL/L (ref 135–145)
WBC # BLD AUTO: 9.4 10E3/UL (ref 4–11)

## 2024-05-04 PROCEDURE — 97530 THERAPEUTIC ACTIVITIES: CPT | Mod: GP

## 2024-05-04 PROCEDURE — 250N000013 HC RX MED GY IP 250 OP 250 PS 637: Performed by: INTERNAL MEDICINE

## 2024-05-04 PROCEDURE — 80048 BASIC METABOLIC PNL TOTAL CA: CPT

## 2024-05-04 PROCEDURE — 85004 AUTOMATED DIFF WBC COUNT: CPT

## 2024-05-04 PROCEDURE — 120N000001 HC R&B MED SURG/OB

## 2024-05-04 PROCEDURE — 97535 SELF CARE MNGMENT TRAINING: CPT | Mod: GO

## 2024-05-04 PROCEDURE — 99232 SBSQ HOSP IP/OBS MODERATE 35: CPT | Performed by: INTERNAL MEDICINE

## 2024-05-04 PROCEDURE — 36415 COLL VENOUS BLD VENIPUNCTURE: CPT

## 2024-05-04 PROCEDURE — 97116 GAIT TRAINING THERAPY: CPT | Mod: GP

## 2024-05-04 PROCEDURE — 250N000013 HC RX MED GY IP 250 OP 250 PS 637

## 2024-05-04 RX ORDER — BISACODYL 5 MG
10 TABLET, DELAYED RELEASE (ENTERIC COATED) ORAL DAILY PRN
Status: DISCONTINUED | OUTPATIENT
Start: 2024-05-04 | End: 2024-05-06 | Stop reason: HOSPADM

## 2024-05-04 RX ADMIN — ACETAMINOPHEN 1000 MG: 500 TABLET, FILM COATED ORAL at 16:54

## 2024-05-04 RX ADMIN — HYDROXYZINE HYDROCHLORIDE 10 MG: 10 TABLET ORAL at 16:54

## 2024-05-04 RX ADMIN — CETIRIZINE HYDROCHLORIDE 10 MG: 10 TABLET, FILM COATED ORAL at 08:53

## 2024-05-04 RX ADMIN — SENNOSIDES AND DOCUSATE SODIUM 1 TABLET: 8.6; 5 TABLET ORAL at 08:53

## 2024-05-04 RX ADMIN — METHOCARBAMOL 750 MG: 750 TABLET ORAL at 18:43

## 2024-05-04 RX ADMIN — HYDROMORPHONE HYDROCHLORIDE 2 MG: 2 TABLET ORAL at 18:43

## 2024-05-04 RX ADMIN — POLYETHYLENE GLYCOL 3350 17 G: 17 POWDER, FOR SOLUTION ORAL at 08:53

## 2024-05-04 RX ADMIN — HYDROMORPHONE HYDROCHLORIDE 2 MG: 2 TABLET ORAL at 06:36

## 2024-05-04 RX ADMIN — ATORVASTATIN CALCIUM 20 MG: 20 TABLET, FILM COATED ORAL at 22:22

## 2024-05-04 RX ADMIN — AMLODIPINE BESYLATE 5 MG: 5 TABLET ORAL at 08:53

## 2024-05-04 RX ADMIN — SERTRALINE HYDROCHLORIDE 100 MG: 100 TABLET ORAL at 08:53

## 2024-05-04 RX ADMIN — DONEPEZIL HYDROCHLORIDE 5 MG: 5 TABLET ORAL at 22:22

## 2024-05-04 RX ADMIN — SENNOSIDES AND DOCUSATE SODIUM 1 TABLET: 8.6; 5 TABLET ORAL at 20:02

## 2024-05-04 RX ADMIN — RALOXIFENE HYDROCHLORIDE 60 MG: 60 TABLET, FILM COATED ORAL at 08:53

## 2024-05-04 RX ADMIN — ACETAMINOPHEN 1000 MG: 500 TABLET, FILM COATED ORAL at 08:53

## 2024-05-04 RX ADMIN — HYDROMORPHONE HYDROCHLORIDE 2 MG: 2 TABLET ORAL at 14:39

## 2024-05-04 RX ADMIN — NADOLOL 40 MG: 20 TABLET ORAL at 08:52

## 2024-05-04 RX ADMIN — PANTOPRAZOLE SODIUM 40 MG: 40 TABLET, DELAYED RELEASE ORAL at 06:36

## 2024-05-04 RX ADMIN — HYDROXYZINE HYDROCHLORIDE 10 MG: 10 TABLET ORAL at 08:53

## 2024-05-04 ASSESSMENT — ACTIVITIES OF DAILY LIVING (ADL)
ADLS_ACUITY_SCORE: 29
ADLS_ACUITY_SCORE: 25
ADLS_ACUITY_SCORE: 32
ADLS_ACUITY_SCORE: 25
ADLS_ACUITY_SCORE: 25
ADLS_ACUITY_SCORE: 32
ADLS_ACUITY_SCORE: 25
ADLS_ACUITY_SCORE: 30
ADLS_ACUITY_SCORE: 31
ADLS_ACUITY_SCORE: 30
ADLS_ACUITY_SCORE: 30
ADLS_ACUITY_SCORE: 32
ADLS_ACUITY_SCORE: 29
ADLS_ACUITY_SCORE: 32
ADLS_ACUITY_SCORE: 25
ADLS_ACUITY_SCORE: 25
ADLS_ACUITY_SCORE: 31
ADLS_ACUITY_SCORE: 32
ADLS_ACUITY_SCORE: 32
ADLS_ACUITY_SCORE: 25
ADLS_ACUITY_SCORE: 32
ADLS_ACUITY_SCORE: 25
ADLS_ACUITY_SCORE: 25

## 2024-05-04 NOTE — PROGRESS NOTES
[facility]    Medicine Progress Note - Hospitalist Service    Date of Admission:  5/1/2024    Assessment & Plan    Summary of Stay: Suzette Calix is a 69 year old female admitted on 5/1/2024.She underwent spinal surgery as below      PROCEDURE PERFORMED:  1) L2 to L5 Oblique Lateral Lumbar Interbody fusion with discectomy, preparation of the endplate and placement of a titanium bullet cage packed with tri-calcium phosphate soaked in bone marrow anterior to the transverse process in modified prone position, with intraoperative biplanar fluoroscopic imaging and electrophysiological monitoring.  2) L2 to  L5  Posterior minimally invasive pedicle screw placement and posterolateral instrumentation and fusion with system with intraoperative biplanar fluoroscopic imaging and electrophysiological monitoring.  3) Transpedicular Bone marrow aspiration through separate incision     Hospitalist service was consulted for postoperative medical management.        Problem List with Assessment and Plan:     #1.  Chronic back pain secondary to L2-5 degenerative scoliosis and stenosis and claudication  -- Postoperative following above procedure  -- No further issues of worsening mental status changes  --- Will continue postoperative care including close monitoring of her mental status, vitals and risk of delirium  -Spine service modified patient's pain regimen with trial of switching to oral hydromorphone and discontinued oxycodone     #2.  Hypertension  -- Chronic hypertension on home amlodipine 10 mg once a day  -- Resumed  -Stable hemodynamics     #3.  GERD on omeprazole, continued     #4.  Hyperlipidemia on atorvastatin, continued      #5.  Anxiety, depression-Home medications resumed     #6.  Suspected dementia: Continue home Aricept  --Patient has diagnosis of dementia and appears to have some confusion intermittently.  She is at risk of increased encephalopathy needs to be closely monitored.  As needed  "Seroquel for agitation.     #7.  Acute postoperative hypoxia secondary to atelectasis  -- Incentive spirometry, supplemental oxygen as needed     #8.  Generalized weakness and postoperative status  -- PT and OT service consulted.  Patient may benefit from rehabilitation.   consulted  -Plans with intention going to a TCU.  Working with   -Appreciate input from .  Plans for TCU awaiting placement     #9.  Acute postoperative blood loss anemia  -- Preop hemoglobin was 13.4.  Hemoglobin dropped to 9.8.  Will continue to monitor hemoglobin and transfuse as needed     -Subsequent hemoglobin showing improvement currently at 10 g.  Reassuring electrolytes panel          Diet: Advance Diet as Tolerated: Regular Diet Adult  Diet    DVT Prophylaxis: Pneumatic Compression Devices and Ambulate every shift  Piedra Catheter: Not present  Lines: None     Cardiac Monitoring: None  Code Status: Full Code      Clinically Significant Risk Factors                         # Overweight: Estimated body mass index is 26.25 kg/m  as calculated from the following:    Height as of this encounter: 1.613 m (5' 3.5\").    Weight as of this encounter: 68.3 kg (150 lb 9.2 oz)., PRESENT ON ADMISSION            Disposition Plan     Medically Ready for Discharge: Defer to primary spine service             Ranulfo Jones MD, MD  Hospitalist Service  [facility]  Securely message with [employer] (more info)  Text page via [employer] Paging/Directory   ______________________________________________________________________    Interval History   Continuing medicine consult service today  No significant reported events overnight.  Continues to demonstrate stable hemodynamics.  No issues of overt severe agitation or mental status changes  Reportedly able to tolerate oral diet.  Not requiring oxygen support.  Voiding freely.  Pain under reasonable control      Physical Exam   Vital Signs: Temp: 97  F " (36.1  C) Temp src: Temporal BP: 132/49 Pulse: 70   Resp: 16 SpO2: 94 % O2 Device: None (Room air)    Weight: 150 lbs 9.19 oz    HEENT; Atraumatic, normocephalic, pinkish conjuctiva, pupils bilateral reactive   Skin: warm and moist, no rashes  Lungs: equal chest expansion, clear to auscultation, no wheezes, no stridor, no crackles,   Heart: normal rate, normal rhythm, no rubs or gallops.   Abdomen: normal bowel sounds, no tenderness, no peritoneal signs, no guarding  Extremities: no deformities, no edema   Neuro; follow commands, alert and oriented x3, spontaneous speech, coherent, moves all extremities spontaneously  Psych; no hallucination, euthymic mood, not agitated      Medical Decision Making       30 MINUTES SPENT BY ME on the date of service doing chart review, history, exam, documentation & further activities per the note.  MANAGEMENT DISCUSSED with the following over the past 24 hours: Yes   NOTE(S)/MEDICAL RECORDS REVIEWED over the past 24 hours: Yes       Data     I have personally reviewed the following data over the past 24 hrs:    9.4  \   10.0 (L)   / 219     142 108 (H) 14.2 /  125 (H)   3.6 25 0.58 \       Imaging results reviewed over the past 24 hrs:   No results found for this or any previous visit (from the past 24 hour(s)).

## 2024-05-04 NOTE — PLAN OF CARE
"Goal Outcome Evaluation:    Plan of Care Reviewed With: patient, spouse    Overall Patient Progress: improving    Outcome Evaluation: Up with A1 GB/walker. Brace when OOB. Voiding. On room air. PIV saline locked. Pain managed by PRN oral dilaudid x2 and robaxin.    Problem: Adult Inpatient Plan of Care  Goal: Plan of Care Review  Description: The Plan of Care Review/Shift note should be completed every shift.  The Outcome Evaluation is a brief statement about your assessment that the patient is improving, declining, or no change.  This information will be displayed automatically on your shift  note.  Outcome: Progressing  Flowsheets (Taken 5/4/2024 0110)  Outcome Evaluation: Up with A1 GB/walker. Brace when OOB. Voiding. On room air. PIV saline locked. Pain managed by PRN oral dilaudid and robaxin.  Plan of Care Reviewed With:   patient   spouse  Overall Patient Progress: improving  Goal: Patient-Specific Goal (Individualized)  Description: You can add care plan individualizations to a care plan. Examples of Individualization might be:  \"Parent requests to be called daily at 9am for status\", \"I have a hard time hearing out of my right ear\", or \"Do not touch me to wake me up as it startles  me\".  Outcome: Progressing  Goal: Absence of Hospital-Acquired Illness or Injury  Outcome: Progressing  Intervention: Identify and Manage Fall Risk  Recent Flowsheet Documentation  Taken 5/3/2024 2355 by Jason Wiseman RN  Safety Promotion/Fall Prevention:   clutter free environment maintained   safety round/check completed   activity supervised   assistive device/personal items within reach   nonskid shoes/slippers when out of bed   room organization consistent  Intervention: Prevent Skin Injury  Recent Flowsheet Documentation  Taken 5/3/2024 2355 by Jason Wiseman RN  Body Position: supine, head elevated  Skin Protection: adhesive use limited  Device Skin Pressure Protection: absorbent pad utilized/changed  Intervention: " Prevent and Manage VTE (Venous Thromboembolism) Risk  Recent Flowsheet Documentation  Taken 5/3/2024 2355 by Jason Wiseman RN  VTE Prevention/Management: SCDs (sequential compression devices) on  Intervention: Prevent Infection  Recent Flowsheet Documentation  Taken 5/3/2024 2355 by Jason Wiseman RN  Infection Prevention:   hand hygiene promoted   rest/sleep promoted   single patient room provided  Goal: Optimal Comfort and Wellbeing  Outcome: Progressing  Goal: Readiness for Transition of Care  Outcome: Progressing

## 2024-05-04 NOTE — PROGRESS NOTES
Care Management Follow Up    Length of Stay (days): 3    Expected Discharge Date: 05/06/2024     Concerns to be Addressed: discharge planning     Patient plan of care discussed at interdisciplinary rounds: Yes    Anticipated Discharge Disposition: Transitional Care     Anticipated Discharge Services:    Anticipated Discharge DME:      Patient/family educated on Medicare website which has current facility and service quality ratings: yes  Education Provided on the Discharge Plan:    Patient/Family in Agreement with the Plan: yes    Referrals Placed by CM/SW: Post Acute Facilities  Private pay costs discussed: Not applicable    Additional Information:  AMANDA spoke with [facility], [facility]  They think their office is only open M-F, so it might not be until Monday.  They will call once they get the approval.    AMANDA updated pt and spouse, they are hoping to get into [facility], referral also sent but haven't heard back if they can accept, see AMANDA note yesterday.    KAVITA Goodwin, [facility] Hospital[facility]ANDA  Inpatient Care Coordination  [facility]  [phone]    NILAY GOODWIN

## 2024-05-04 NOTE — PLAN OF CARE
Patient vital signs are at baseline: No, high max SBPs 150s  Patient able to ambulate as they were prior to admission or with assist devices provided by therapies during their stay:  No, A1 belt + walker  Patient MUST void prior to discharge:  Yes  Patient able to tolerate oral intake:  Yes  Pain has adequate pain control using Oral analgesics:  Yes  Does patient have an identified :  Yes  Has goal D/C date and time been discussed with patient:  Yes     Afebrile.  Pain managed with PRN PO pain meds, declined cold pack, often rated #s high but able to rest/sleep between cares.  No nausea.  LS clear bilat., RA, encouraged hourly CDB/IS x10.  CMS+.  Drsg sterilely changed, incisions approximated steri-stripes intact no drainage.  Up A1 belt walker + brace OOB, ambulating.  Voiding, LBM today.  Plan to DC to [facility] in [place] pending approval.

## 2024-05-04 NOTE — PLAN OF CARE
"Goal Outcome Evaluation:    Patient A&O x4   VSS throughout shift on RA   Transfers Ax1 GB & walker, brace OOB  Pain managed with Dilaudid, Atarax   CMS intact, dressing CDI   Voiding ok at bedside commode     Problem: Adult Inpatient Plan of Care  Goal: Plan of Care Review  Description: The Plan of Care Review/Shift note should be completed every shift.  The Outcome Evaluation is a brief statement about your assessment that the patient is improving, declining, or no change.  This information will be displayed automatically on your shift  note.  Outcome: Progressing  Flowsheets (Taken 5/3/2024 2109)  Plan of Care Reviewed With: patient  Overall Patient Progress: improving  Goal: Patient-Specific Goal (Individualized)  Description: You can add care plan individualizations to a care plan. Examples of Individualization might be:  \"Parent requests to be called daily at 9am for status\", \"I have a hard time hearing out of my right ear\", or \"Do not touch me to wake me up as it startles  me\".  Outcome: Progressing  Goal: Absence of Hospital-Acquired Illness or Injury  Outcome: Progressing  Intervention: Identify and Manage Fall Risk  Recent Flowsheet Documentation  Taken 5/3/2024 1540 by Joy Downing RN  Safety Promotion/Fall Prevention:   clutter free environment maintained   safety round/check completed  Intervention: Prevent Skin Injury  Recent Flowsheet Documentation  Taken 5/3/2024 1540 by Joy Downing RN  Body Position: position changed independently  Intervention: Prevent and Manage VTE (Venous Thromboembolism) Risk  Recent Flowsheet Documentation  Taken 5/3/2024 1540 by Joy Downing RN  VTE Prevention/Management: SCDs (sequential compression devices) off  Intervention: Prevent Infection  Recent Flowsheet Documentation  Taken 5/3/2024 1540 by Joy Downing RN  Infection Prevention: hand hygiene promoted  Goal: Optimal Comfort and Wellbeing  Outcome: Progressing  Intervention: Monitor Pain " and Promote Comfort  Recent Flowsheet Documentation  Taken 5/3/2024 1800 by Joy Downing, RN  Pain Management Interventions: cold applied  Taken 5/3/2024 1540 by Joy Downing, RN  Pain Management Interventions:   medication (see MAR)   cold applied  Goal: Readiness for Transition of Care  Outcome: Progressing         Plan of Care Reviewed With: patient    Overall Patient Progress: improvingOverall Patient Progress: improving

## 2024-05-05 ENCOUNTER — APPOINTMENT (OUTPATIENT)
Dept: PHYSICAL THERAPY | Facility: CLINIC | Age: 69
DRG: 454 | End: 2024-05-05
Attending: ORTHOPAEDIC SURGERY
Payer: MEDICARE

## 2024-05-05 ENCOUNTER — APPOINTMENT (OUTPATIENT)
Dept: OCCUPATIONAL THERAPY | Facility: CLINIC | Age: 69
DRG: 454 | End: 2024-05-05
Attending: ORTHOPAEDIC SURGERY
Payer: MEDICARE

## 2024-05-05 PROCEDURE — 120N000001 HC R&B MED SURG/OB

## 2024-05-05 PROCEDURE — 250N000013 HC RX MED GY IP 250 OP 250 PS 637: Performed by: INTERNAL MEDICINE

## 2024-05-05 PROCEDURE — 97530 THERAPEUTIC ACTIVITIES: CPT | Mod: GP | Performed by: PHYSICAL THERAPIST

## 2024-05-05 PROCEDURE — 99232 SBSQ HOSP IP/OBS MODERATE 35: CPT | Performed by: INTERNAL MEDICINE

## 2024-05-05 PROCEDURE — 250N000013 HC RX MED GY IP 250 OP 250 PS 637

## 2024-05-05 PROCEDURE — 97116 GAIT TRAINING THERAPY: CPT | Mod: GP | Performed by: PHYSICAL THERAPIST

## 2024-05-05 PROCEDURE — 97535 SELF CARE MNGMENT TRAINING: CPT | Mod: GO | Performed by: OCCUPATIONAL THERAPIST

## 2024-05-05 RX ADMIN — AMLODIPINE BESYLATE 5 MG: 5 TABLET ORAL at 08:37

## 2024-05-05 RX ADMIN — ACETAMINOPHEN 1000 MG: 500 TABLET, FILM COATED ORAL at 04:16

## 2024-05-05 RX ADMIN — HYDROMORPHONE HYDROCHLORIDE 2 MG: 2 TABLET ORAL at 00:10

## 2024-05-05 RX ADMIN — ACETAMINOPHEN 1000 MG: 500 TABLET, FILM COATED ORAL at 12:20

## 2024-05-05 RX ADMIN — CETIRIZINE HYDROCHLORIDE 10 MG: 10 TABLET, FILM COATED ORAL at 08:37

## 2024-05-05 RX ADMIN — HYDROMORPHONE HYDROCHLORIDE 2 MG: 2 TABLET ORAL at 08:37

## 2024-05-05 RX ADMIN — HYDROMORPHONE HYDROCHLORIDE 2 MG: 2 TABLET ORAL at 04:16

## 2024-05-05 RX ADMIN — ATORVASTATIN CALCIUM 20 MG: 20 TABLET, FILM COATED ORAL at 22:22

## 2024-05-05 RX ADMIN — DONEPEZIL HYDROCHLORIDE 5 MG: 5 TABLET ORAL at 22:22

## 2024-05-05 RX ADMIN — HYDROXYZINE HYDROCHLORIDE 10 MG: 10 TABLET ORAL at 12:20

## 2024-05-05 RX ADMIN — RALOXIFENE HYDROCHLORIDE 60 MG: 60 TABLET, FILM COATED ORAL at 08:37

## 2024-05-05 RX ADMIN — HYDROMORPHONE HYDROCHLORIDE 2 MG: 2 TABLET ORAL at 13:28

## 2024-05-05 RX ADMIN — SERTRALINE HYDROCHLORIDE 100 MG: 100 TABLET ORAL at 08:37

## 2024-05-05 RX ADMIN — HYDROMORPHONE HYDROCHLORIDE 2 MG: 2 TABLET ORAL at 18:53

## 2024-05-05 RX ADMIN — PANTOPRAZOLE SODIUM 40 MG: 40 TABLET, DELAYED RELEASE ORAL at 08:37

## 2024-05-05 RX ADMIN — HYDROXYZINE HYDROCHLORIDE 10 MG: 10 TABLET ORAL at 00:10

## 2024-05-05 RX ADMIN — METHOCARBAMOL 750 MG: 750 TABLET ORAL at 13:28

## 2024-05-05 RX ADMIN — NADOLOL 40 MG: 20 TABLET ORAL at 08:36

## 2024-05-05 RX ADMIN — METHOCARBAMOL 750 MG: 750 TABLET ORAL at 07:11

## 2024-05-05 ASSESSMENT — ACTIVITIES OF DAILY LIVING (ADL)
ADLS_ACUITY_SCORE: 32
ADLS_ACUITY_SCORE: 32
ADLS_ACUITY_SCORE: 33
ADLS_ACUITY_SCORE: 33
ADLS_ACUITY_SCORE: 32
ADLS_ACUITY_SCORE: 33
ADLS_ACUITY_SCORE: 33
ADLS_ACUITY_SCORE: 32
ADLS_ACUITY_SCORE: 32
ADLS_ACUITY_SCORE: 33
ADLS_ACUITY_SCORE: 32
ADLS_ACUITY_SCORE: 32
ADLS_ACUITY_SCORE: 30
ADLS_ACUITY_SCORE: 32
ADLS_ACUITY_SCORE: 33
ADLS_ACUITY_SCORE: 32
ADLS_ACUITY_SCORE: 30
ADLS_ACUITY_SCORE: 30
ADLS_ACUITY_SCORE: 32
ADLS_ACUITY_SCORE: 33
ADLS_ACUITY_SCORE: 32
ADLS_ACUITY_SCORE: 30
ADLS_ACUITY_SCORE: 32

## 2024-05-05 NOTE — PLAN OF CARE
"Goal Outcome Evaluation:    Plan of Care Reviewed With: patient, spouse    Overall Patient Progress: improving    Outcome Evaluation: Up with A1 GB/walker. Brace when OOB. Pain managed by PRN oral dilaudid x2, atarax and tylenol. On room air. PIV saline locked. Plan for discharge to TCU. Awaiting placement.    Problem: Adult Inpatient Plan of Care  Goal: Plan of Care Review  Description: The Plan of Care Review/Shift note should be completed every shift.  The Outcome Evaluation is a brief statement about your assessment that the patient is improving, declining, or no change.  This information will be displayed automatically on your shift  note.  5/5/2024 0439 by Jason Wiseman RN  Outcome: Progressing  Flowsheets (Taken 5/5/2024 0439)  Outcome Evaluation: Up with A1 GB/walker. Brace when OOB. Pain managed by PRN oral dilaudid x2, atarax and tylenol. On room air. PIV saline locked. Plan for discharge to TCU. Awaiting placement.  Plan of Care Reviewed With:   patient   spouse  Overall Patient Progress: improving    Goal: Patient-Specific Goal (Individualized)  Description: You can add care plan individualizations to a care plan. Examples of Individualization might be:  \"Parent requests to be called daily at 9am for status\", \"I have a hard time hearing out of my right ear\", or \"Do not touch me to wake me up as it startles  me\".  5/5/2024 0439 by Jason Wiseman RN  Outcome: Progressing  Goal: Absence of Hospital-Acquired Illness or Injury  5/5/2024 0439 by Jason Wiseman RN  Outcome: Progressing  Goal: Optimal Comfort and Wellbeing  5/5/2024 0439 by Jason Wiseman RN  Outcome: Progressing  Goal: Readiness for Transition of Care  5/5/2024 0439 by Jason Wiseman RN  Outcome: Progressing    Problem: Spinal Surgery  Goal: Optimal Coping with Surgery  5/5/2024 0439 by Jason Wiseman RN  Outcome: Progressing  Goal: Absence of Bleeding  5/5/2024 0439 by Jason Wiseman RN  Outcome: Progressing  Goal: " Effective Bowel Elimination  5/5/2024 0439 by Jason Wiseman RN  Outcome: Progressing  5/5/2024 0437 by Jason Wiseman RN  Outcome: Progressing  Goal: Fluid and Electrolyte Balance  5/5/2024 0439 by Jason Wiseman RN  Outcome: Progressing  5/5/2024 0437 by Jason Wiseman RN  Outcome: Progressing  Goal: Optimal Functional Ability  5/5/2024 0439 by Jason Wiseman RN  Outcome: Progressing  Intervention: Optimize Functional Status  Recent Flowsheet Documentation  Taken 5/4/2024 2338 by Jason Wiseman RN  Activity Management:   ambulated to bathroom   back to bed  Goal: Absence of Infection Signs and Symptoms  5/5/2024 0439 by Jason Wiseman RN  Outcome: Progressing  Goal: Optimal Neurologic Function  5/5/2024 0439 by Jason Wiseman RN  Outcome: Progressing  Goal: Anesthesia/Sedation Recovery  5/5/2024 0439 by Jason Wiseman RN  Outcome: Progressing  Goal: Optimal Pain Control and Function  5/5/2024 0439 by Jason Wiseman RN  Outcome: Progressing  Goal: Nausea and Vomiting Relief  5/5/2024 0439 by Jason Wiseman RN  Outcome: Progressing  Goal: Effective Urinary Elimination  5/5/2024 0439 by Jason Wiseman RN  Outcome: Progressing  Goal: Effective Oxygenation and Ventilation  5/5/2024 0439 by Jason Wiseman RN  Outcome: Progressing

## 2024-05-05 NOTE — PLAN OF CARE
Patient vital signs are at baseline: Yes  Patient able to ambulate as they were prior to admission or with assist devices provided by therapies during their stay:  No, A1 belt + walker  Patient MUST void prior to discharge:  Yes  Patient able to tolerate oral intake:  Yes  Pain has adequate pain control using Oral analgesics:  Yes  Does patient have an identified :  Yes  Has goal D/C date and time been discussed with patient:  Yes      Afebrile.  Sad teary at times, explained thoroughly POC pain regimen, encouraged rest/repo.  Pain managed with PRN PO pain meds & cold pack.  No nausea.  LS clear bilat., RA, encouraged hourly IS x10.  CMS+.  Drsg removed by Surgery PA, incisions approximated steri-stripes intact no drainage, RICHARD.  Up A1 belt walker + brace OOB, sat up recliner, ambulating.  Voiding, LBM today stool soft.  Plan to DC to [facility] in [place] pending approval.

## 2024-05-05 NOTE — PROGRESS NOTES
[facility]    Hospitalist Progress Note  Name: Suzette Calix    MRN: [id number]  Provider:  Jose Manning DO  Date of Service: 05/05/2024    Summary of Stay: Suzette Calix is a 69 year old female admitted on 5/1/2024.She underwent spinal surgery as below:     PROCEDURE PERFORMED:  1) L2 to L5 Oblique Lateral Lumbar Interbody fusion with discectomy, preparation of the endplate and placement of a titanium bullet cage packed with tri-calcium phosphate soaked in bone marrow anterior to the transverse process in modified prone position, with intraoperative biplanar fluoroscopic imaging and electrophysiological monitoring.  2) L2 to  L5  Posterior minimally invasive pedicle screw placement and posterolateral instrumentation and fusion with system with intraoperative biplanar fluoroscopic imaging and electrophysiological monitoring.  3) Transpedicular Bone marrow aspiration through separate incision     Hospitalist service was consulted for postoperative medical management.    TODAY'S PLAN:  No changes.  Pt is medically stable for discharge to TCU from IM perspective.  Appreciate SW assistance with placement.    Problem List:   #1.  Chronic back pain secondary to L2-5 degenerative scoliosis and stenosis and claudication  -- Postoperative following above procedure  -- No further issues of worsening mental status changes  --- Will continue postoperative care including close monitoring of her mental status, vitals and risk of delirium  -Spine service modified patient's pain regimen with trial of switching to oral hydromorphone and discontinued oxycodone     #2.  Hypertension  -- Chronic hypertension on home amlodipine 10 mg once a day  -- Resumed  -Stable hemodynamics     #3.  GERD on omeprazole, continued     #4.  Hyperlipidemia on atorvastatin, continued      #5.  Anxiety, depression-Home medications resumed     #6.  Suspected dementia: Continue home Aricept  --Patient has diagnosis of dementia and  "appears to have some confusion intermittently.  She is at risk of increased encephalopathy needs to be closely monitored.  As needed Seroquel for agitation.     #7.  Acute postoperative hypoxia secondary to atelectasis  -- Incentive spirometry, supplemental oxygen as needed     #8.  Generalized weakness and postoperative status  -- PT and OT service consulted.  Patient may benefit from rehabilitation.   consulted  -Plans with intention going to a TCU.  Working with   -Appreciate input from .  Plans for TCU awaiting placement     #9.  Acute postoperative blood loss anemia  -- Preop hemoglobin was 13.4.  Hemoglobin dropped to 9.8.  Will continue to monitor hemoglobin and transfuse as needed     -Subsequent hemoglobin showing improvement currently at 10 g.  Reassuring electrolytes panel    Clinically Significant Risk Factors                         # Overweight: Estimated body mass index is 26.25 kg/m  as calculated from the following:    Height as of this encounter: 1.613 m (5' 3.5\").    Weight as of this encounter: 68.3 kg (150 lb 9.2 oz).             I spent 46 minutes in reviewing this patient's labs, imaging, medications, medical history.  In addition time was spent interviewing the patient, communicating with family, and medical decision making.      DVT Prophylaxis: Defer to primary service  Code Status: Full Code  Diet: Advance Diet as Tolerated: Regular Diet Adult  Diet    Piedra Catheter: Not present    Disposition: Medically Ready for Discharge: Anticipated Tomorrow    Goals to discharge include: TCU bed available.  Pt is from [facility] and looking for TCU in [place].  Family updated today: Yes ,  at bedside.     Interval History   Pt seen and examined.  Pt denies any cp, chest pressure/tightness, sob.    -Data reviewed today: I personally reviewed all new labs and imaging results over the last 24 hours.     Physical Exam   Temp: 96.8  F (36  C) Temp src: " Temporal BP: (!) 148/49 Pulse: 64   Resp: 18 SpO2: 100 % O2 Device: None (Room air)    Vitals:    04/30/24 1300 05/01/24 0550 05/01/24 1323   Weight: 66.7 kg (147 lb) 65.8 kg (145 lb) 68.3 kg (150 lb 9.2 oz)     Vital Signs with Ranges  Temp:  [96.8  F (36  C)-97  F (36.1  C)] 96.8  F (36  C)  Pulse:  [58-68] 64  Resp:  [16-18] 18  BP: (136-154)/(49-61) 148/49  SpO2:  [96 %-100 %] 100 %  I/O last 3 completed shifts:  In: 990 [P.O.:990]  Out: 1875 [Urine:1875]    GENERAL: No apparent distress. Awake, alert, and fully oriented.  HEENT: Normocephalic, atraumatic. Extraocular movements intact.  CARDIOVASCULAR: Regular rate and rhythm without murmurs or rubs. No S3.  PULMONARY: Clear bilaterally.  GASTROINTESTINAL: Soft, non-tender, non-distended. Bowel sounds normoactive.   EXTREMITIES: No cyanosis or clubbing. No edema.  NEUROLOGICAL: CN 2-12 grossly intact, no focal neurological deficits.  DERMATOLOGICAL: No rash, ulcer, bruising, nor jaundice.    Medications   Current Facility-Administered Medications   Medication Dose Route Frequency Provider Last Rate Last Admin     Current Facility-Administered Medications   Medication Dose Route Frequency Provider Last Rate Last Admin    amLODIPine (NORVASC) tablet 5 mg  5 mg Oral Daily Miguel Henriquez MD   5 mg at 05/05/24 0837    atorvastatin (LIPITOR) tablet 20 mg  20 mg Oral At Bedtime Miguel Henriquez MD   20 mg at 05/04/24 2222    cetirizine (zyrTEC) tablet 10 mg  10 mg Oral Daily Miguel Henriquez MD   10 mg at 05/05/24 0837    donepezil (ARICEPT) tablet 5 mg  5 mg Oral At Bedtime Miguel Henriquez MD   5 mg at 05/04/24 2222    nadolol (CORGARD) tablet 40 mg  40 mg Oral Daily Miguel Henriquez MD   40 mg at 05/05/24 0836    pantoprazole (PROTONIX) EC tablet 40 mg  40 mg Oral QAM Miguel Gottlieb MD   40 mg at 05/05/24 0837    polyethylene glycol (MIRALAX) Packet 17 g  17 g Oral Daily Cassandra Robison PA-C   17 g at 05/04/24 0853    raloxifene (EVISTA) tablet  "60 mg  60 mg Oral Daily Miguel Henriquez MD   60 mg at 05/05/24 0837    senna-docusate (SENOKOT-S/PERICOLACE) 8.6-50 MG per tablet 1 tablet  1 tablet Oral BID Miguel Henriquez MD   1 tablet at 05/04/24 2002    sertraline (ZOLOFT) tablet 100 mg  100 mg Oral Daily Miguel Henriquez MD   100 mg at 05/05/24 0837    sodium chloride (PF) 0.9% PF flush 3 mL  3 mL Intracatheter Q8H Cassandra Robison PA-C   3 mL at 05/05/24 0849     Data     Laboratory:  Recent Labs   Lab 05/04/24  0604 05/03/24  0604 05/02/24  0709   WBC 9.4 8.8 12.2*   HGB 10.0* 9.6* 9.8*   HCT 29.7* 29.4* 29.1*   MCV 89 90 89    184 193     Recent Labs   Lab 05/04/24  0604 05/03/24  0604 05/02/24  0709    139 138   POTASSIUM 3.6 3.7 3.8   CHLORIDE 108* 106 106   CO2 25 24 25   ANIONGAP 9 9 7   * 107* 119*   BUN 14.2 17.2 18.4   CR 0.58 0.76 0.77   GFRESTIMATED >90 84 83   NAHUM 8.4* 8.4* 8.1*     No results for input(s): \"CULT\" in the last 168 hours.  No results found for: \"TROPI\"    Imaging:  No results found for this or any previous visit (from the past 24 hour(s)).      Jose Manning DO  [facility] Hospitalist  [address] [place] [address]  Securely message with [employer] (more info)  Text page via [employer] Paging/Directory   05/05/2024   "

## 2024-05-05 NOTE — PROGRESS NOTES
DAILY PROGRESS NOTE    Suzette Calix is a 69 year old old female admitted on 5/1/2024  5:37 AM.    Subjective  POD 4 - Patient found resting in bed this AM with spouse at her side. Patient doing better today than immediatly after surgery, though still has dipesh concerns about lower extremity weakness and the length of her recovery. Reports R weaker than left. TCU placement pending, but will know more from facilities come Monday.     Removed dressing this morning and will leave incision to open air. Wound well healing without drainage or concerns, but requiring occasional icing for back pain.     Objective    Sensation grossly intact on all lower extremities, equal weakness in quadricepts and anterior tibialis, AAOx3, CRISTOBLA, Ambulating with assistance, incision C/D/I.     Hemoglobin   Date Value Ref Range Status   05/04/2024 10.0 (L) 11.7 - 15.7 g/dL Final   ]      Impression / Plan  Medically cleared for discharge to TCU  Patient doing well  Ambulate with assistance  Full diet  Today  Await update from TCU facilities.     DM, [facility] Contact # [phone]

## 2024-05-06 ENCOUNTER — APPOINTMENT (OUTPATIENT)
Dept: OCCUPATIONAL THERAPY | Facility: CLINIC | Age: 69
DRG: 454 | End: 2024-05-06
Attending: ORTHOPAEDIC SURGERY
Payer: MEDICARE

## 2024-05-06 VITALS
SYSTOLIC BLOOD PRESSURE: 143 MMHG | DIASTOLIC BLOOD PRESSURE: 40 MMHG | HEIGHT: 64 IN | TEMPERATURE: 98 F | OXYGEN SATURATION: 95 % | RESPIRATION RATE: 16 BRPM | BODY MASS INDEX: 25.71 KG/M2 | WEIGHT: 150.57 LBS | HEART RATE: 63 BPM

## 2024-05-06 PROCEDURE — 250N000013 HC RX MED GY IP 250 OP 250 PS 637: Performed by: INTERNAL MEDICINE

## 2024-05-06 PROCEDURE — 250N000013 HC RX MED GY IP 250 OP 250 PS 637

## 2024-05-06 PROCEDURE — 97535 SELF CARE MNGMENT TRAINING: CPT | Mod: GO

## 2024-05-06 PROCEDURE — 99232 SBSQ HOSP IP/OBS MODERATE 35: CPT | Performed by: INTERNAL MEDICINE

## 2024-05-06 RX ADMIN — ACETAMINOPHEN 1000 MG: 500 TABLET, FILM COATED ORAL at 12:44

## 2024-05-06 RX ADMIN — HYDROMORPHONE HYDROCHLORIDE 2 MG: 2 TABLET ORAL at 08:49

## 2024-05-06 RX ADMIN — POLYETHYLENE GLYCOL 3350 17 G: 17 POWDER, FOR SOLUTION ORAL at 08:49

## 2024-05-06 RX ADMIN — NADOLOL 40 MG: 20 TABLET ORAL at 08:49

## 2024-05-06 RX ADMIN — AMLODIPINE BESYLATE 5 MG: 5 TABLET ORAL at 08:49

## 2024-05-06 RX ADMIN — HYDROMORPHONE HYDROCHLORIDE 2 MG: 2 TABLET ORAL at 03:45

## 2024-05-06 RX ADMIN — HYDROMORPHONE HYDROCHLORIDE 2 MG: 2 TABLET ORAL at 13:55

## 2024-05-06 RX ADMIN — METHOCARBAMOL 750 MG: 750 TABLET ORAL at 10:44

## 2024-05-06 RX ADMIN — CETIRIZINE HYDROCHLORIDE 10 MG: 10 TABLET, FILM COATED ORAL at 08:49

## 2024-05-06 RX ADMIN — PANTOPRAZOLE SODIUM 40 MG: 40 TABLET, DELAYED RELEASE ORAL at 06:55

## 2024-05-06 RX ADMIN — HYDROXYZINE HYDROCHLORIDE 10 MG: 10 TABLET ORAL at 12:44

## 2024-05-06 RX ADMIN — METHOCARBAMOL 750 MG: 750 TABLET ORAL at 03:45

## 2024-05-06 RX ADMIN — SERTRALINE HYDROCHLORIDE 100 MG: 100 TABLET ORAL at 08:49

## 2024-05-06 RX ADMIN — RALOXIFENE HYDROCHLORIDE 60 MG: 60 TABLET, FILM COATED ORAL at 08:48

## 2024-05-06 ASSESSMENT — ACTIVITIES OF DAILY LIVING (ADL)
ADLS_ACUITY_SCORE: 33
ADLS_ACUITY_SCORE: 33
ADLS_ACUITY_SCORE: 31
ADLS_ACUITY_SCORE: 33
ADLS_ACUITY_SCORE: 31
ADLS_ACUITY_SCORE: 33
ADLS_ACUITY_SCORE: 33
ADLS_ACUITY_SCORE: 31
ADLS_ACUITY_SCORE: 33
ADLS_ACUITY_SCORE: 31
ADLS_ACUITY_SCORE: 31
ADLS_ACUITY_SCORE: 33

## 2024-05-06 NOTE — PLAN OF CARE
Physical Therapy Discharge Summary    Reason for therapy discharge:    Discharged to transitional care facility.    Progress towards therapy goal(s). See goals on Care Plan in [facility] electronic health record for goal details.  Goals not met.  Barriers to achieving goals:   discharge from facility.    Therapy recommendation(s):    Continued therapy is recommended.  Rationale/Recommendations:  Recommend continued therapy at TCU to progress independence with mobility and strength.

## 2024-05-06 NOTE — PLAN OF CARE
"Goal Outcome Evaluation:    Plan of Care Reviewed With: patient, spouse    Overall Patient Progress: improving    Outcome Evaluation: Intermittent confusion observed. Frustrated about pain when ambulating.  at bedside. PRN dilaudid x1 and robaxin x1 given for pain. Plan for TCU at discharge. Awaiting placement.    Problem: Adult Inpatient Plan of Care  Goal: Plan of Care Review  Description: The Plan of Care Review/Shift note should be completed every shift.  The Outcome Evaluation is a brief statement about your assessment that the patient is improving, declining, or no change.  This information will be displayed automatically on your shift  note.  Outcome: Progressing  Flowsheets (Taken 5/6/2024 0424)  Outcome Evaluation: Intermittent confusion observed. Frustrated about pain when ambulating.  at bedside. PRN dilaudid x1 and robaxin x1 given for pain. Plan for TCU at discharge. Awaiting placement.  Plan of Care Reviewed With:   patient   spouse  Overall Patient Progress: improving  Goal: Patient-Specific Goal (Individualized)  Description: You can add care plan individualizations to a care plan. Examples of Individualization might be:  \"Parent requests to be called daily at 9am for status\", \"I have a hard time hearing out of my right ear\", or \"Do not touch me to wake me up as it startles  me\".  Outcome: Progressing  Goal: Absence of Hospital-Acquired Illness or Injury  Outcome: Progressing  Intervention: Identify and Manage Fall Risk  Recent Flowsheet Documentation  Taken 5/6/2024 0035 by Jason Wiseman RN  Safety Promotion/Fall Prevention:   room near nurse's station   supervised activity   safety round/check completed   room organization consistent   patient and family education   nonskid shoes/slippers when out of bed   mobility aid in reach   lighting adjusted   clutter free environment maintained   assistive device/personal items within reach   activity supervised  Intervention: Prevent Skin " Injury  Recent Flowsheet Documentation  Taken 5/6/2024 0035 by Jason Wiseman RN  Body Position:   supine, head elevated   legs elevated  Skin Protection:   adhesive use limited   incontinence pads utilized  Device Skin Pressure Protection:   adhesive use limited   pressure points protected  Intervention: Prevent and Manage VTE (Venous Thromboembolism) Risk  Recent Flowsheet Documentation  Taken 5/6/2024 0035 by Jason Wiseman RN  VTE Prevention/Management:   patient refused intervention   SCDs (sequential compression devices) off  Intervention: Prevent Infection  Recent Flowsheet Documentation  Taken 5/6/2024 0035 by Jason Wiseman RN  Infection Prevention:   hand hygiene promoted   equipment surfaces disinfected  Goal: Optimal Comfort and Wellbeing  Outcome: Progressing  Goal: Readiness for Transition of Care  Outcome: Progressing     Problem: Spinal Surgery  Goal: Optimal Coping with Surgery  Outcome: Progressing  Intervention: Support Psychosocial Response to Surgery  Recent Flowsheet Documentation  Taken 5/6/2024 0035 by Jason Wiseman RN  Supportive Measures:   active listening utilized   decision-making supported   goal-setting facilitated   positive reinforcement provided   problem-solving facilitated   relaxation techniques promoted   self-care encouraged   verbalization of feelings encouraged  Goal: Absence of Bleeding  Outcome: Progressing  Intervention: Monitor and Manage Bleeding  Recent Flowsheet Documentation  Taken 5/6/2024 0035 by Jason Wiseman RN  Bleeding Management: dressing monitored  Goal: Effective Bowel Elimination  Outcome: Progressing  Intervention: Enhance Bowel Motility and Elimination  Recent Flowsheet Documentation  Taken 5/6/2024 0035 by Jason Wiseman RN  Bowel Motility Enhancement:   ambulation promoted   fluid intake encouraged   oral intake encouraged  Bowel Elimination Management: toileting offered  Goal: Fluid and Electrolyte Balance  Outcome:  Progressing  Intervention: Monitor and Manage Fluid and Electrolyte Balance  Recent Flowsheet Documentation  Taken 5/6/2024 0035 by Jason Wiseman RN  Fluid/Electrolyte Management: fluids provided  Goal: Optimal Functional Ability  Outcome: Progressing  Intervention: Optimize Functional Status  Recent Flowsheet Documentation  Taken 5/6/2024 0216 by Jason Wiseman RN  Activity Management:   ambulated to bathroom   back to bed  Taken 5/6/2024 0035 by Jason Wiseman RN  Self-Care Promotion:   BADL personal objects within reach   BADL personal routines maintained   adaptive equipment use encouraged  Positioning/Transfer Devices: pillows  Taken 5/5/2024 2245 by Jason Wiseman RN  Activity Management:   ambulated to bathroom   back to bed  Goal: Absence of Infection Signs and Symptoms  Outcome: Progressing  Intervention: Prevent or Manage Infection  Recent Flowsheet Documentation  Taken 5/6/2024 0035 by Jason Wiseman RN  Infection Prevention:   hand hygiene promoted   equipment surfaces disinfected  Infection Management: aseptic technique maintained  Goal: Optimal Neurologic Function  Outcome: Progressing  Intervention: Optimize Neurologic Function  Recent Flowsheet Documentation  Taken 5/6/2024 0035 by Jason Wiseman RN  Body Position:   supine, head elevated   legs elevated  Range of Motion: active ROM (range of motion) encouraged  Goal: Anesthesia/Sedation Recovery  Outcome: Progressing  Intervention: Optimize Anesthesia Recovery  Recent Flowsheet Documentation  Taken 5/6/2024 0035 by Jason Wiseman RN  Safety Promotion/Fall Prevention:   room near nurse's station   supervised activity   safety round/check completed   room organization consistent   patient and family education   nonskid shoes/slippers when out of bed   mobility aid in reach   lighting adjusted   clutter free environment maintained   assistive device/personal items within reach   activity supervised  Goal: Optimal Pain  Control and Function  Outcome: Progressing  Goal: Nausea and Vomiting Relief  Outcome: Progressing  Intervention: Prevent or Manage Nausea and Vomiting  Recent Flowsheet Documentation  Taken 5/6/2024 0035 by Jason Wiseman RN  Nausea/Vomiting Interventions:   sips of clear liquids given   nausea triggers minimized  Goal: Effective Urinary Elimination  Outcome: Progressing  Intervention: Monitor and Manage Urinary Retention  Recent Flowsheet Documentation  Taken 5/6/2024 0035 by Jason Wiseman RN  Urinary Elimination Promotion: toileting offered  Goal: Effective Oxygenation and Ventilation  Outcome: Progressing  Intervention: Optimize Oxygenation and Ventilation  Recent Flowsheet Documentation  Taken 5/6/2024 0035 by Jason Wiseman RN  Airway/Ventilation Management: calming measures promoted  Head of Bed (HOB) Positioning: HOB at 20-30 degrees

## 2024-05-06 NOTE — PROGRESS NOTES
[facility]    Medicine Progress Note - Hospitalist Service    Date of Admission:  5/1/2024    Assessment & Plan   Summary of Stay: Suzette Calix is a 69 year old female admitted on 5/1/2024.She underwent spinal surgery as below      PROCEDURE PERFORMED:  1) L2 to L5 Oblique Lateral Lumbar Interbody fusion with discectomy, preparation of the endplate and placement of a titanium bullet cage packed with tri-calcium phosphate soaked in bone marrow anterior to the transverse process in modified prone position, with intraoperative biplanar fluoroscopic imaging and electrophysiological monitoring.  2) L2 to  L5  Posterior minimally invasive pedicle screw placement and posterolateral instrumentation and fusion with system with intraoperative biplanar fluoroscopic imaging and electrophysiological monitoring.  3) Transpedicular Bone marrow aspiration through separate incision     Hospitalist service was consulted for postoperative medical management.        Problem List with Assessment and Plan:     #1.  Chronic back pain secondary to L2-5 degenerative scoliosis and stenosis and claudication  -- Postoperative following above procedure  -- No further issues of worsening mental status changes  --- Will continue postoperative care including close monitoring of her mental status, vitals and risk of delirium  -Spine service modified patient's pain regimen with trial of switching to oral hydromorphone and discontinued oxycodone     #2.  Hypertension  -- Chronic hypertension on home amlodipine 10 mg once a day  -- Resumed  -Stable hemodynamics     #3.  GERD on omeprazole, continued     #4.  Hyperlipidemia on atorvastatin, continued      #5.  Anxiety, depression-Home medications resumed     #6.  Suspected dementia: Continue home Aricept  --Patient has diagnosis of dementia and appears to have some confusion intermittently.  She is at risk of increased encephalopathy needs to be closely monitored.  As needed  "Seroquel for agitation.     #7.  Acute postoperative hypoxia secondary to atelectasis  -- Incentive spirometry, supplemental oxygen as needed     #8.  Generalized weakness and postoperative status  -- PT and OT service consulted.  Patient may benefit from rehabilitation.   consulted  -Plans with intention going to a TCU.  Working with   -Appreciate input from .  Plans for TCU awaiting placement     #9.  Acute postoperative blood loss anemia  -- Preop hemoglobin was 13.4.  Hemoglobin dropped to 9.8.  Will continue to monitor hemoglobin and transfuse as needed     -Subsequent hemoglobin showing improvement currently at 10 g.  Reassuring electrolytes panel          Diet: Advance Diet as Tolerated: Regular Diet Adult  Diet    DVT Prophylaxis: Pneumatic Compression Devices and Defer to primary service  Piedra Catheter: Not present  Lines: None     Cardiac Monitoring: None  Code Status: Full Code      Clinically Significant Risk Factors                         # Overweight: Estimated body mass index is 26.25 kg/m  as calculated from the following:    Height as of this encounter: 1.613 m (5' 3.5\").    Weight as of this encounter: 68.3 kg (150 lb 9.2 oz).             Disposition Plan     Medically Ready for Discharge: Ready Now    Awaiting for placement and approval of TCU         Ranulfo Jones MD, MD  Hospitalist Service  [facility]  Securely message with [employer] (more info)  Text page via [facility] Paging/Directory   ______________________________________________________________________    Interval History   I assumed medicine consult service today.  However I met this pleasant lady over the weekend.  This morning she shared with me that she had a decent amount of sleep overnight.  Happy to report that she she had several BM earlier.  Voiding freely.  Tolerating oral diet.  Still having some issues with intermittent uncontrolled pain.  Stable hemodynamics. "  No reported mental status changes.  Afebrile.  Not requiring oxygen support    Physical Exam   Vital Signs: Temp: 98  F (36.7  C) Temp src: Temporal BP: (!) 143/40 Pulse: 63   Resp: 16 SpO2: 95 % O2 Device: None (Room air)    Weight: 150 lbs 9.19 oz    HEENT; Atraumatic, normocephalic, pinkish conjuctiva, pupils bilateral reactive   Skin: warm and moist, no rashes  Lungs: equal chest expansion, clear to auscultation, no wheezes, no stridor, no crackles,   Heart: normal rate, normal rhythm, no rubs or gallops.   Abdomen: normal bowel sounds, no tenderness, no peritoneal signs, no guarding  Extremities: no deformities, no edema   Neuro; follow commands, alert and oriented x3, spontaneous speech, coherent, moves all extremities spontaneously  Psych; no hallucination, euthymic mood, not agitated      Medical Decision Making       35 MINUTES SPENT BY ME on the date of service doing chart review, history, exam, documentation & further activities per the note.  MANAGEMENT DISCUSSED with the following over the past 24 hours: Yes   NOTE(S)/MEDICAL RECORDS REVIEWED over the past 24 hours: Yes       Data         Imaging results reviewed over the past 24 hrs:   No results found for this or any previous visit (from the past 24 hour(s)).

## 2024-05-06 NOTE — PLAN OF CARE
"Pt A&O x4-forgetful @ times. PO dilaudid, atarax, robaxin, and tylenol given for pain. CMS intact. Incision open to air. Up w/ Ax1, using gait belt, and walker. Brace on when OOB. Voiding. Tolerating regular diet.     Reviewed discharge instructions with patient and spouse. Questions answered. Patient discharged to TCU via spouse with discharge instructions, and belongings.    Problem: Adult Inpatient Plan of Care  Goal: Plan of Care Review  Description: The Plan of Care Review/Shift note should be completed every shift.  The Outcome Evaluation is a brief statement about your assessment that the patient is improving, declining, or no change.  This information will be displayed automatically on your shift  note.  Outcome: Adequate for Care Transition  Goal: Patient-Specific Goal (Individualized)  Description: You can add care plan individualizations to a care plan. Examples of Individualization might be:  \"Parent requests to be called daily at 9am for status\", \"I have a hard time hearing out of my right ear\", or \"Do not touch me to wake me up as it startles  me\".  Outcome: Adequate for Care Transition  Goal: Absence of Hospital-Acquired Illness or Injury  Outcome: Adequate for Care Transition  Intervention: Identify and Manage Fall Risk  Recent Flowsheet Documentation  Taken 5/6/2024 0851 by Bhakti Byrnes RN  Safety Promotion/Fall Prevention:   activity supervised   assistive device/personal items within reach   nonskid shoes/slippers when out of bed   safety round/check completed  Intervention: Prevent Skin Injury  Recent Flowsheet Documentation  Taken 5/6/2024 0851 by Bhakti Byrnes RN  Device Skin Pressure Protection: positioning supports utilized  Intervention: Prevent and Manage VTE (Venous Thromboembolism) Risk  Recent Flowsheet Documentation  Taken 5/6/2024 0851 by Bhakti Byrnes RN  VTE Prevention/Management: SCDs (sequential compression devices) off  Intervention: Prevent Infection  Recent " Flowsheet Documentation  Taken 5/6/2024 0851 by Bhakti Byrnes RN  Infection Prevention: rest/sleep promoted  Goal: Optimal Comfort and Wellbeing  Outcome: Adequate for Care Transition  Intervention: Monitor Pain and Promote Comfort  Recent Flowsheet Documentation  Taken 5/6/2024 1355 by Bhakti Byrnes RN  Pain Management Interventions: medication (see MAR)  Taken 5/6/2024 1244 by Bhakti Byrnes RN  Pain Management Interventions: medication (see MAR)  Taken 5/6/2024 0849 by Bhakti Byrnes RN  Pain Management Interventions: medication (see MAR)  Goal: Readiness for Transition of Care  Outcome: Adequate for Care Transition     Problem: Spinal Surgery  Goal: Optimal Coping with Surgery  Outcome: Adequate for Care Transition  Goal: Absence of Bleeding  Outcome: Adequate for Care Transition  Goal: Effective Bowel Elimination  Outcome: Adequate for Care Transition  Intervention: Enhance Bowel Motility and Elimination  Recent Flowsheet Documentation  Taken 5/6/2024 0851 by Bhakti Byrnes RN  Bowel Motility Enhancement:   ambulation promoted   fluid intake encouraged   oral intake encouraged  Bowel Elimination Management: toileting offered  Goal: Fluid and Electrolyte Balance  Outcome: Adequate for Care Transition  Intervention: Monitor and Manage Fluid and Electrolyte Balance  Recent Flowsheet Documentation  Taken 5/6/2024 0851 by Bhakti Byrnes RN  Fluid/Electrolyte Management: fluids provided  Goal: Optimal Functional Ability  Outcome: Adequate for Care Transition  Intervention: Optimize Functional Status  Recent Flowsheet Documentation  Taken 5/6/2024 1245 by Bhakti Byrnes RN  Activity Management: ambulated to bathroom  Taken 5/6/2024 0845 by Bhakti Byrnes RN  Activity Management: ambulated to bathroom  Goal: Absence of Infection Signs and Symptoms  Outcome: Adequate for Care Transition  Intervention: Prevent or Manage Infection  Recent Flowsheet Documentation  Taken 5/6/2024 0851 by Fitz  JOAN Ya  Infection Prevention: rest/sleep promoted  Infection Management: aseptic technique maintained  Goal: Optimal Neurologic Function  Outcome: Adequate for Care Transition  Goal: Anesthesia/Sedation Recovery  Outcome: Adequate for Care Transition  Intervention: Optimize Anesthesia Recovery  Recent Flowsheet Documentation  Taken 5/6/2024 0851 by Bhakti Byrnes RN  Safety Promotion/Fall Prevention:   activity supervised   assistive device/personal items within reach   nonskid shoes/slippers when out of bed   safety round/check completed  Goal: Optimal Pain Control and Function  Outcome: Adequate for Care Transition  Intervention: Prevent or Manage Pain  Recent Flowsheet Documentation  Taken 5/6/2024 1355 by Bhakti Byrnes RN  Pain Management Interventions: medication (see MAR)  Taken 5/6/2024 1244 by Bhakti Byrnes RN  Pain Management Interventions: medication (see MAR)  Taken 5/6/2024 0849 by Bhakti Byrnes RN  Pain Management Interventions: medication (see MAR)  Goal: Nausea and Vomiting Relief  Outcome: Adequate for Care Transition  Goal: Effective Urinary Elimination  Outcome: Adequate for Care Transition  Intervention: Monitor and Manage Urinary Retention  Recent Flowsheet Documentation  Taken 5/6/2024 0851 by Bhakti Byrnes RN  Urinary Elimination Promotion: toileting offered  Goal: Effective Oxygenation and Ventilation  Outcome: Adequate for Care Transition  Intervention: Optimize Oxygenation and Ventilation  Recent Flowsheet Documentation  Taken 5/6/2024 0851 by Bhakti Byrnes RN  Airway/Ventilation Management: airway patency maintained   Goal Outcome Evaluation:

## 2024-05-06 NOTE — PROGRESS NOTES
Vitals stable.  Complains of right hip and low back pain. On dilaudid and oxcodone.   TCU transfer today to [facility].  Neuro intact.

## 2024-05-06 NOTE — PROGRESS NOTES
Care Management Discharge Note    Discharge Date: 05/06/2024       Discharge Disposition: Transitional Care      Discharge Transportation: family or friend will provide    Private pay costs discussed: Not applicable    Does the patient's insurance plan have a 3 day qualifying hospital stay waiver?  No    PAS Confirmation Code:  (facility completed [place] one)  Patient/family educated on Medicare website which has current facility and service quality ratings: yes    Education Provided on the Discharge Plan:    Persons Notified of Discharge Plans: TCU, patient and spouse  Patient/Family in Agreement with the Plan: yes    Additional Information:  TCU received the clear from [place] for admit. Pt is admitting today, spouse is transporting.  SW provided spouse with address/contact info and added it to AVS.    Orders faxed to [phone]    KAVITA Goodwin, NILAY  Inpatient Care Coordination  [facility]  [phone]      NILAY GOODWIN

## 2024-05-07 NOTE — PLAN OF CARE
"Physical Therapy Discharge Summary    Reason for therapy discharge:    Discharged to transitional care facility.    Progress towards therapy goal(s). See goals on Care Plan in [facility] electronic health record for goal details.  Goals partially met.  Barriers to achieving goals:   discharge from facility.    Therapy recommendation(s):    Continued therapy is recommended.  Rationale/Recommendations:  Per prior PT recommendation, \"Patient significantly below reported baseline level of function; currently requiring A x 1 and walker for limited gait; increased pain in R leg/back; high falls risk; unsafe to return home in current condition; unable to complete stairs safely; Currently would need TCU; if patient becomes min A x 1 with all mobility and cares and able to do stairs, could return home with assist of spouse and would benefit from Home PT\".      "

## 2024-05-08 NOTE — DISCHARGE SUMMARY
This patient was admitted for L2 to L5 OLLIF for stenosis and scoliosis.  See op note for details.    Hospital Course:    Postop she did well.   No radicular pain.  Some low back and right hip pain which were controlled with oral pain meds.  Finally POD #4 she was ready for discharge to a TCU care in [place].  Further followup at [facility].      Johann Lozano MD

## 2024-05-09 NOTE — PLAN OF CARE
"Occupational Therapy Discharge Summary    Reason for therapy discharge:    Discharged to transitional care facility.    Progress towards therapy goal(s). See goals on Care Plan in [facility] electronic health record for goal details.  Goals not met.  Barriers to achieving goals:   limited tolerance for therapy and discharge from facility.    Therapy recommendation(s):    Continued therapy is recommended.  Rationale/Recommendations:  per treating OT \"Pt remains significantly below baseline level of functioning, limited by impaired activity tolerance and pain. Recommend ongoing skilled OT while IP and in TCU setting to improve strength, functional activity tolerance, balance and safety needed for daily tasks\".    **This patient was not seen by writing OT, information for discharge summary taken from treating OT's notes.**    "

## (undated) DEVICE — GLOVE BIOGEL PI MICRO INDICATOR UNDERGLOVE SZ 8.0 48980

## (undated) DEVICE — Device

## (undated) DEVICE — DRAPE C-ARM 60X42" 1013

## (undated) DEVICE — BAG CLEAR TRASH 1.3M 39X33" P4040C

## (undated) DEVICE — LINEN DRAPE 54X72" 5467

## (undated) DEVICE — DRAPE MAYO STAND 23X54 8337

## (undated) DEVICE — PACK SMALL SPINE RIDGES

## (undated) DEVICE — GLOVE BIOGEL PI MICRO SZ 8.0 48580

## (undated) DEVICE — CUSHION INSERT LG PRONE VIEW JACKSON TABLE

## (undated) DEVICE — SU VICRYL 2-0 CT-2 27" UND J269H

## (undated) DEVICE — GLOVE BIOGEL PI MICRO SZ 6.5 48565

## (undated) DEVICE — LINEN ORTHO ACL PACK 5447

## (undated) DEVICE — ESU GROUND PAD ADULT W/CORD E7507

## (undated) DEVICE — DRSG ABDOMINAL 07 1/2X8" 7197D

## (undated) DEVICE — GLOVE BIOGEL PI MICRO SZ 7.5 48575

## (undated) DEVICE — DRSG STERI STRIP 1/2X4" R1547

## (undated) DEVICE — DRAPE IOBAN ISOLATION VERTICAL 6619

## (undated) DEVICE — GLOVE BIOGEL PI MICRO INDICATOR UNDERGLOVE SZ 7.0 48970

## (undated) DEVICE — FLEXIBLE CURETTE-BLADE

## (undated) DEVICE — DISPOSABLE MONOPOLAR PROBE

## (undated) DEVICE — BLADE KNIFE SURG 11 371111

## (undated) DEVICE — GLOVE BIOGEL PI SZ 8.0 40880

## (undated) DEVICE — DECANTER BAG 2002S

## (undated) DEVICE — DECANTER VIAL 2006S

## (undated) DEVICE — 10G BONE ACCESS TOOLS/KIT

## (undated) DEVICE — CATH TRAY FOLEY SURESTEP 16FR W/URINE MTR STATLK LF A303416A

## (undated) DEVICE — PREP DURAPREP 26ML APL 8630

## (undated) DEVICE — SOL ADH LIQUID BENZOIN SWAB 0.6ML C1544

## (undated) RX ORDER — ONDANSETRON 2 MG/ML
INJECTION INTRAMUSCULAR; INTRAVENOUS
Status: DISPENSED
Start: 2024-05-01

## (undated) RX ORDER — FENTANYL CITRATE-0.9 % NACL/PF 10 MCG/ML
PLASTIC BAG, INJECTION (ML) INTRAVENOUS
Status: DISPENSED
Start: 2024-05-01

## (undated) RX ORDER — DEXAMETHASONE SODIUM PHOSPHATE 4 MG/ML
INJECTION, SOLUTION INTRA-ARTICULAR; INTRALESIONAL; INTRAMUSCULAR; INTRAVENOUS; SOFT TISSUE
Status: DISPENSED
Start: 2024-05-01

## (undated) RX ORDER — PROPOFOL 10 MG/ML
INJECTION, EMULSION INTRAVENOUS
Status: DISPENSED
Start: 2024-05-01

## (undated) RX ORDER — CEFAZOLIN SODIUM/WATER 2 G/20 ML
SYRINGE (ML) INTRAVENOUS
Status: DISPENSED
Start: 2024-05-01

## (undated) RX ORDER — LIDOCAINE HYDROCHLORIDE 10 MG/ML
INJECTION, SOLUTION EPIDURAL; INFILTRATION; INTRACAUDAL; PERINEURAL
Status: DISPENSED
Start: 2024-05-01

## (undated) RX ORDER — CITRIC ACID/SODIUM CITRATE 334-500MG
SOLUTION, ORAL ORAL
Status: DISPENSED
Start: 2024-05-01

## (undated) RX ORDER — TRANEXAMIC ACID 10 MG/ML
INJECTION, SOLUTION INTRAVENOUS
Status: DISPENSED
Start: 2024-05-01

## (undated) RX ORDER — FENTANYL CITRATE 50 UG/ML
INJECTION, SOLUTION INTRAMUSCULAR; INTRAVENOUS
Status: DISPENSED
Start: 2024-05-01

## (undated) RX ORDER — GLYCOPYRROLATE 0.2 MG/ML
INJECTION, SOLUTION INTRAMUSCULAR; INTRAVENOUS
Status: DISPENSED
Start: 2024-05-01

## (undated) RX ORDER — ACETAMINOPHEN 325 MG/1
TABLET ORAL
Status: DISPENSED
Start: 2024-05-01

## (undated) RX ORDER — BUPIVACAINE HYDROCHLORIDE 2.5 MG/ML
INJECTION, SOLUTION EPIDURAL; INFILTRATION; INTRACAUDAL
Status: DISPENSED
Start: 2024-05-01